# Patient Record
Sex: FEMALE | Race: WHITE | NOT HISPANIC OR LATINO | Employment: OTHER
[De-identification: names, ages, dates, MRNs, and addresses within clinical notes are randomized per-mention and may not be internally consistent; named-entity substitution may affect disease eponyms.]

---

## 2017-06-06 ENCOUNTER — HOSPITAL ENCOUNTER (OUTPATIENT)
Dept: LAB | Age: 72
Setting detail: SPECIMEN
Discharge: HOME | End: 2017-06-06

## 2017-10-05 ENCOUNTER — HOSPITAL ENCOUNTER (OUTPATIENT)
Dept: MAMMOGRAPHY | Facility: HOSPITAL | Age: 72
Discharge: HOME | End: 2017-10-05
Attending: FAMILY MEDICINE

## 2017-10-05 DIAGNOSIS — Z12.31 VISIT FOR SCREENING MAMMOGRAM: ICD-10-CM

## 2017-10-10 ENCOUNTER — RECORDS - HEALTHEAST (OUTPATIENT)
Dept: ADMINISTRATIVE | Facility: OTHER | Age: 72
End: 2017-10-10

## 2017-11-06 ENCOUNTER — ANESTHESIA - HEALTHEAST (OUTPATIENT)
Dept: SURGERY | Facility: CLINIC | Age: 72
End: 2017-11-06

## 2017-11-06 ENCOUNTER — SURGERY - HEALTHEAST (OUTPATIENT)
Dept: SURGERY | Facility: CLINIC | Age: 72
End: 2017-11-06

## 2017-11-07 ASSESSMENT — MIFFLIN-ST. JEOR: SCORE: 1234.32

## 2018-02-20 ENCOUNTER — RECORDS - HEALTHEAST (OUTPATIENT)
Dept: LAB | Facility: CLINIC | Age: 73
End: 2018-02-20

## 2018-02-20 LAB — POTASSIUM BLD-SCNC: 4 MMOL/L (ref 3.5–5)

## 2018-06-12 ENCOUNTER — RECORDS - HEALTHEAST (OUTPATIENT)
Dept: LAB | Facility: CLINIC | Age: 73
End: 2018-06-12

## 2018-06-12 LAB
ALBUMIN SERPL-MCNC: 3.9 G/DL (ref 3.5–5)
ALP SERPL-CCNC: 72 U/L (ref 45–120)
ALT SERPL W P-5'-P-CCNC: 12 U/L (ref 0–45)
ANION GAP SERPL CALCULATED.3IONS-SCNC: 9 MMOL/L (ref 5–18)
AST SERPL W P-5'-P-CCNC: 15 U/L (ref 0–40)
BILIRUB SERPL-MCNC: 0.8 MG/DL (ref 0–1)
BUN SERPL-MCNC: 18 MG/DL (ref 8–28)
CALCIUM SERPL-MCNC: 9.3 MG/DL (ref 8.5–10.5)
CHLORIDE BLD-SCNC: 105 MMOL/L (ref 98–107)
CHOLEST SERPL-MCNC: 162 MG/DL
CO2 SERPL-SCNC: 28 MMOL/L (ref 22–31)
CREAT SERPL-MCNC: 0.68 MG/DL (ref 0.6–1.1)
FASTING STATUS PATIENT QL REPORTED: NO
GFR SERPL CREATININE-BSD FRML MDRD: >60 ML/MIN/1.73M2
GLUCOSE BLD-MCNC: 108 MG/DL (ref 70–125)
HDLC SERPL-MCNC: 69 MG/DL
LDLC SERPL CALC-MCNC: 82 MG/DL
POTASSIUM BLD-SCNC: 4.4 MMOL/L (ref 3.5–5)
PROT SERPL-MCNC: 6.4 G/DL (ref 6–8)
SODIUM SERPL-SCNC: 142 MMOL/L (ref 136–145)
T4 FREE SERPL-MCNC: 1.1 NG/DL (ref 0.7–1.8)
TRIGL SERPL-MCNC: 56 MG/DL
TSH SERPL DL<=0.005 MIU/L-ACNC: 2.64 UIU/ML (ref 0.3–5)

## 2018-11-13 ENCOUNTER — HOSPITAL ENCOUNTER (OUTPATIENT)
Dept: MAMMOGRAPHY | Facility: CLINIC | Age: 73
Discharge: HOME | End: 2018-11-13
Attending: FAMILY MEDICINE

## 2018-11-13 DIAGNOSIS — Z12.31 VISIT FOR SCREENING MAMMOGRAM: ICD-10-CM

## 2018-12-11 ENCOUNTER — RECORDS - HEALTHEAST (OUTPATIENT)
Dept: LAB | Facility: CLINIC | Age: 73
End: 2018-12-11

## 2018-12-11 LAB
T4 FREE SERPL-MCNC: 1.1 NG/DL (ref 0.7–1.8)
TSH SERPL DL<=0.005 MIU/L-ACNC: 2.9 UIU/ML (ref 0.3–5)

## 2019-05-30 ENCOUNTER — RECORDS - HEALTHEAST (OUTPATIENT)
Dept: LAB | Facility: CLINIC | Age: 74
End: 2019-05-30

## 2019-05-30 LAB
ALBUMIN SERPL-MCNC: 4 G/DL (ref 3.5–5)
ALP SERPL-CCNC: 90 U/L (ref 45–120)
ALT SERPL W P-5'-P-CCNC: <9 U/L (ref 0–45)
ANION GAP SERPL CALCULATED.3IONS-SCNC: 12 MMOL/L (ref 5–18)
AST SERPL W P-5'-P-CCNC: 15 U/L (ref 0–40)
BILIRUB SERPL-MCNC: 0.8 MG/DL (ref 0–1)
BUN SERPL-MCNC: 20 MG/DL (ref 8–28)
CALCIUM SERPL-MCNC: 10.3 MG/DL (ref 8.5–10.5)
CHLORIDE BLD-SCNC: 103 MMOL/L (ref 98–107)
CHOLEST SERPL-MCNC: 165 MG/DL
CO2 SERPL-SCNC: 26 MMOL/L (ref 22–31)
CREAT SERPL-MCNC: 0.66 MG/DL (ref 0.6–1.1)
FASTING STATUS PATIENT QL REPORTED: NO
GFR SERPL CREATININE-BSD FRML MDRD: >60 ML/MIN/1.73M2
GLUCOSE BLD-MCNC: 89 MG/DL (ref 70–125)
HDLC SERPL-MCNC: 68 MG/DL
LDLC SERPL CALC-MCNC: 84 MG/DL
POTASSIUM BLD-SCNC: 4.2 MMOL/L (ref 3.5–5)
PROT SERPL-MCNC: 6.4 G/DL (ref 6–8)
SODIUM SERPL-SCNC: 141 MMOL/L (ref 136–145)
T4 FREE SERPL-MCNC: 1.1 NG/DL (ref 0.7–1.8)
TRIGL SERPL-MCNC: 66 MG/DL
TSH SERPL DL<=0.005 MIU/L-ACNC: 2.84 UIU/ML (ref 0.3–5)

## 2019-06-21 ENCOUNTER — RECORDS - HEALTHEAST (OUTPATIENT)
Dept: LAB | Facility: CLINIC | Age: 74
End: 2019-06-21

## 2019-06-21 LAB
ANION GAP SERPL CALCULATED.3IONS-SCNC: 9 MMOL/L (ref 5–18)
BUN SERPL-MCNC: 22 MG/DL (ref 8–28)
CALCIUM SERPL-MCNC: 9.4 MG/DL (ref 8.5–10.5)
CHLORIDE BLD-SCNC: 107 MMOL/L (ref 98–107)
CO2 SERPL-SCNC: 25 MMOL/L (ref 22–31)
CREAT SERPL-MCNC: 0.66 MG/DL (ref 0.6–1.1)
GFR SERPL CREATININE-BSD FRML MDRD: >60 ML/MIN/1.73M2
GLUCOSE BLD-MCNC: 121 MG/DL (ref 70–125)
POTASSIUM BLD-SCNC: 4.3 MMOL/L (ref 3.5–5)
SODIUM SERPL-SCNC: 141 MMOL/L (ref 136–145)

## 2019-09-12 ASSESSMENT — MIFFLIN-ST. JEOR: SCORE: 1238.85

## 2019-09-13 ENCOUNTER — ANESTHESIA - HEALTHEAST (OUTPATIENT)
Dept: SURGERY | Facility: CLINIC | Age: 74
End: 2019-09-13

## 2019-09-16 ENCOUNTER — SURGERY - HEALTHEAST (OUTPATIENT)
Dept: SURGERY | Facility: CLINIC | Age: 74
End: 2019-09-16

## 2019-09-16 ASSESSMENT — MIFFLIN-ST. JEOR
SCORE: 1275.14
SCORE: 1221.28

## 2020-01-14 ENCOUNTER — RECORDS - HEALTHEAST (OUTPATIENT)
Dept: LAB | Facility: CLINIC | Age: 75
End: 2020-01-14

## 2020-01-14 LAB
ANION GAP SERPL CALCULATED.3IONS-SCNC: 11 MMOL/L (ref 5–18)
BUN SERPL-MCNC: 17 MG/DL (ref 8–28)
CALCIUM SERPL-MCNC: 9.2 MG/DL (ref 8.5–10.5)
CHLORIDE BLD-SCNC: 103 MMOL/L (ref 98–107)
CO2 SERPL-SCNC: 26 MMOL/L (ref 22–31)
CREAT SERPL-MCNC: 0.72 MG/DL (ref 0.6–1.1)
GFR SERPL CREATININE-BSD FRML MDRD: >60 ML/MIN/1.73M2
GLUCOSE BLD-MCNC: 91 MG/DL (ref 70–125)
MAGNESIUM SERPL-MCNC: 2.1 MG/DL (ref 1.8–2.6)
POTASSIUM BLD-SCNC: 4.7 MMOL/L (ref 3.5–5)
SODIUM SERPL-SCNC: 140 MMOL/L (ref 136–145)
T4 FREE SERPL-MCNC: 1.2 NG/DL (ref 0.7–1.8)
TSH SERPL DL<=0.005 MIU/L-ACNC: 1.5 UIU/ML (ref 0.3–5)

## 2020-07-10 ENCOUNTER — RECORDS - HEALTHEAST (OUTPATIENT)
Dept: LAB | Facility: CLINIC | Age: 75
End: 2020-07-10

## 2020-07-10 LAB
CHOLEST SERPL-MCNC: 157 MG/DL
FASTING STATUS PATIENT QL REPORTED: NORMAL
HDLC SERPL-MCNC: 66 MG/DL
LDLC SERPL CALC-MCNC: 76 MG/DL
T4 FREE SERPL-MCNC: 1.1 NG/DL (ref 0.7–1.8)
TRIGL SERPL-MCNC: 74 MG/DL
TSH SERPL DL<=0.005 MIU/L-ACNC: 1.51 UIU/ML (ref 0.3–5)

## 2020-07-13 LAB — 25(OH)D3 SERPL-MCNC: 46.5 NG/ML (ref 30–80)

## 2020-07-28 ENCOUNTER — HOSPITAL ENCOUNTER (OUTPATIENT)
Dept: MAMMOGRAPHY | Facility: CLINIC | Age: 75
Discharge: HOME | End: 2020-07-28
Attending: FAMILY MEDICINE

## 2020-07-28 DIAGNOSIS — Z12.31 VISIT FOR SCREENING MAMMOGRAM: ICD-10-CM

## 2021-01-15 ENCOUNTER — RECORDS - HEALTHEAST (OUTPATIENT)
Dept: LAB | Facility: CLINIC | Age: 76
End: 2021-01-15

## 2021-01-15 LAB
ALBUMIN SERPL-MCNC: 3.8 G/DL (ref 3.5–5)
ALP SERPL-CCNC: 96 U/L (ref 45–120)
ALT SERPL W P-5'-P-CCNC: 10 U/L (ref 0–45)
ANION GAP SERPL CALCULATED.3IONS-SCNC: 11 MMOL/L (ref 5–18)
AST SERPL W P-5'-P-CCNC: 16 U/L (ref 0–40)
BASOPHILS # BLD AUTO: 0.1 THOU/UL (ref 0–0.2)
BASOPHILS NFR BLD AUTO: 1 % (ref 0–2)
BILIRUB SERPL-MCNC: 0.6 MG/DL (ref 0–1)
BUN SERPL-MCNC: 23 MG/DL (ref 8–28)
CALCIUM SERPL-MCNC: 9.3 MG/DL (ref 8.5–10.5)
CHLORIDE BLD-SCNC: 103 MMOL/L (ref 98–107)
CO2 SERPL-SCNC: 25 MMOL/L (ref 22–31)
CREAT SERPL-MCNC: 0.74 MG/DL (ref 0.6–1.1)
EOSINOPHIL # BLD AUTO: 0.1 THOU/UL (ref 0–0.4)
EOSINOPHIL NFR BLD AUTO: 1 % (ref 0–6)
ERYTHROCYTE [DISTWIDTH] IN BLOOD BY AUTOMATED COUNT: 13.2 % (ref 11–14.5)
GFR SERPL CREATININE-BSD FRML MDRD: >60 ML/MIN/1.73M2
GLUCOSE BLD-MCNC: 100 MG/DL (ref 70–125)
HCT VFR BLD AUTO: 42.9 % (ref 35–47)
HGB BLD-MCNC: 14 G/DL (ref 12–16)
IMM GRANULOCYTES # BLD: 0 THOU/UL
IMM GRANULOCYTES NFR BLD: 0 %
LYMPHOCYTES # BLD AUTO: 1.4 THOU/UL (ref 0.8–4.4)
LYMPHOCYTES NFR BLD AUTO: 19 % (ref 20–40)
MCH RBC QN AUTO: 30 PG (ref 27–34)
MCHC RBC AUTO-ENTMCNC: 32.6 G/DL (ref 32–36)
MCV RBC AUTO: 92 FL (ref 80–100)
MONOCYTES # BLD AUTO: 0.7 THOU/UL (ref 0–0.9)
MONOCYTES NFR BLD AUTO: 9 % (ref 2–10)
NEUTROPHILS # BLD AUTO: 5.2 THOU/UL (ref 2–7.7)
NEUTROPHILS NFR BLD AUTO: 69 % (ref 50–70)
PLATELET # BLD AUTO: 233 THOU/UL (ref 140–440)
PMV BLD AUTO: 11.1 FL (ref 8.5–12.5)
POTASSIUM BLD-SCNC: 4.8 MMOL/L (ref 3.5–5)
PROT SERPL-MCNC: 6.3 G/DL (ref 6–8)
RBC # BLD AUTO: 4.67 MILL/UL (ref 3.8–5.4)
SODIUM SERPL-SCNC: 139 MMOL/L (ref 136–145)
TSH SERPL DL<=0.005 MIU/L-ACNC: 2.27 UIU/ML (ref 0.3–5)
WBC: 7.5 THOU/UL (ref 4–11)

## 2021-05-25 ENCOUNTER — RECORDS - HEALTHEAST (OUTPATIENT)
Dept: ADMINISTRATIVE | Facility: CLINIC | Age: 76
End: 2021-05-25

## 2021-05-26 ENCOUNTER — RECORDS - HEALTHEAST (OUTPATIENT)
Dept: ADMINISTRATIVE | Facility: CLINIC | Age: 76
End: 2021-05-26

## 2021-05-27 ENCOUNTER — RECORDS - HEALTHEAST (OUTPATIENT)
Dept: ADMINISTRATIVE | Facility: CLINIC | Age: 76
End: 2021-05-27

## 2021-05-28 ENCOUNTER — RECORDS - HEALTHEAST (OUTPATIENT)
Dept: ADMINISTRATIVE | Facility: CLINIC | Age: 76
End: 2021-05-28

## 2021-05-31 VITALS — BODY MASS INDEX: 33.79 KG/M2 | HEIGHT: 61 IN | WEIGHT: 179 LBS

## 2021-06-01 NOTE — ANESTHESIA POSTPROCEDURE EVALUATION
Patient: Radha Tatum  RIGHT REVERSE TOTAL SHOULDER ARTHROPLASTY  Anesthesia type: general    Patient location: PACU  Last vitals:   Vitals Value Taken Time   /67 9/16/2019 10:30 AM   Temp 36.2  C (97.2  F) 9/16/2019 10:30 AM   Pulse 71 9/16/2019 10:30 AM   Resp 14 9/16/2019 10:30 AM   SpO2 91 % 9/16/2019 10:32 AM     Post vital signs: stable  Level of consciousness: awake and responds to simple questions  Post-anesthesia pain: pain controlled  Post-anesthesia nausea and vomiting: no  Pulmonary: unassisted, return to baseline  Cardiovascular: stable and blood pressure at baseline  Hydration: adequate  Anesthetic events: no    QCDR Measures:  ASA# 11 - Paz-op Cardiac Arrest: ASA11B - Patient did NOT experience unanticipated cardiac arrest  ASA# 12 - Paz-op Mortality Rate: ASA12B - Patient did NOT die  ASA# 13 - PACU Re-Intubation Rate: ASA13B - Patient did NOT require a new airway mgmt  ASA# 10 - Composite Anes Safety: ASA10A - No serious adverse event    Additional Notes:

## 2021-06-01 NOTE — ANESTHESIA PROCEDURE NOTES
Peripheral Block    Patient location during procedure: pre-op  Start time: 9/16/2019 7:24 AM  End time: 9/16/2019 7:27 AM  post-op analgesia per surgeon order as noted in medical record  Staffing:  Performing  Anesthesiologist: Lexx Godwin MD  Preanesthetic Checklist  Completed: patient identified, site marked, risks, benefits, and alternatives discussed, timeout performed, consent obtained, airway assessed, oxygen available, suction available, emergency drugs available and hand hygiene performed  Peripheral Block  Block type: brachial plexus, interscalene  Prep: ChloraPrep  Patient position: sitting  Patient monitoring: cardiac monitor, continuous pulse oximetry, heart rate and blood pressure  Laterality: right  Injection technique: ultrasound guided    Ultrasound used to visualize needle placement in proximity to nerve being blocked: yes   Permanent ultrasound image captured for medical record  Sterile gel and probe cover used for ultrasound.    Needle  Needle type: Stimuplex   Needle gauge: 20G  Needle length: 4 in  no peripheral nerve catheter placed  Assessment  Injection assessment: no difficulty with injection, negative aspiration for heme, no paresthesia on injection, incremental injection and transient paresthesias

## 2021-06-01 NOTE — ANESTHESIA CARE TRANSFER NOTE
Last vitals:   Vitals:    09/16/19 0935   BP: 156/66   Pulse: 75   Resp: 16   Temp: 36.2  C (97.2  F)   SpO2: 94%     Patient's level of consciousness is drowsy  Spontaneous respirations: yes  Maintains airway independently: yes  Dentition unchanged: yes  Oropharynx: oropharynx clear of all foreign objects    QCDR Measures:  ASA# 20 - Surgical Safety Checklist: WHO surgical safety checklist completed prior to induction    PQRS# 430 - Adult PONV Prevention: 4558F - Pt received => 2 anti-emetic agents (different classes) preop & intraop  ASA# 8 - Peds PONV Prevention: NA - Not pediatric patient, not GA or 2 or more risk factors NOT present  PQRS# 424 - Paz-op Temp Management: 4559F - At least one body temp DOCUMENTED => 35.5C or 95.9F within required timeframe  PQRS# 426 - PACU Transfer Protocol: - Transfer of care checklist used  ASA# 14 - Acute Post-op Pain: ASA14B - Patient did NOT experience pain >= 7 out of 10

## 2021-06-01 NOTE — ANESTHESIA PREPROCEDURE EVALUATION
Anesthesia Evaluation      Patient summary reviewed   History of anesthetic complications     Airway   Mallampati: I  Neck ROM: full   Pulmonary - negative ROS and normal exam    breath sounds clear to auscultation                         Cardiovascular - normal exam  (+) hypertension well controlled, CAD, ,     Rhythm: regular  Rate: normal,         Neuro/Psych - negative ROS     Endo/Other    (+) hypothyroidism, hyperthyroidism, arthritis, obesity,      GI/Hepatic/Renal - negative ROS           Dental    (+) caps and implants                       Anesthesia Plan  Planned anesthetic: general endotracheal and peripheral nerve block    ASA 3   Induction: intravenous   Anesthetic plan and risks discussed with: patient  Anesthesia plan special considerations: antiemetics,   Post-op plan: routine recovery

## 2021-06-03 VITALS — WEIGHT: 188 LBS | HEIGHT: 61 IN | BODY MASS INDEX: 35.5 KG/M2

## 2021-06-05 ENCOUNTER — RECORDS - HEALTHEAST (OUTPATIENT)
Dept: ENDOCRINOLOGY | Facility: CLINIC | Age: 76
End: 2021-06-05

## 2021-06-05 ENCOUNTER — RECORDS - HEALTHEAST (OUTPATIENT)
Dept: MAMMOGRAPHY | Facility: HOSPITAL | Age: 76
End: 2021-06-05

## 2021-06-05 DIAGNOSIS — E04.2 NONTOXIC MULTINODULAR GOITER: ICD-10-CM

## 2021-06-05 DIAGNOSIS — R92.8 OTHER ABNORMAL FINDINGS ON RADIOLOGICAL EXAMINATION OF BREAST: ICD-10-CM

## 2021-06-13 NOTE — ANESTHESIA PREPROCEDURE EVALUATION
Anesthesia Evaluation      Patient summary reviewed   History of anesthetic complications     Airway   Mallampati: II   Pulmonary - normal exam                          Cardiovascular   (+) hypertension, CAD, ,     Rhythm: regular  Rate: normal,         Neuro/Psych      Endo/Other    (+) hypothyroidism, hyperthyroidism,      GI/Hepatic/Renal       Other findings:                          Hyperlipidemia     Hypertension    Coronary Artery Disease    Nontoxic Multinodular Goiter    Subclinical Hyperthyroidism    Vitamin D Deficiency    Osteopenia    Postsurgical Hypothyroidism        UPPER IMPLANT (PERMANENT)      Dental - normal exam                        Anesthesia Plan  Planned anesthetic: spinal  SAB + SAPH+TIB FOR POSTOP PAIN  PONV HX - DEC 10 ZOF 4    MEDICALLY MANAGED CAD - NO SX    THYROIDECTOMY WITH DR DEGROOT - UNEVENTFUL ANESTHETIC  ASA 3     Anesthetic plan and risks discussed with: patient  Anesthesia plan special considerations: antiemetics,   Post-op plan: routine recovery

## 2021-06-13 NOTE — ANESTHESIA CARE TRANSFER NOTE
Last vitals:   Vitals:    11/06/17 1209   BP: (P) 112/74   Pulse: (P) 76   Resp: (P) 10   Temp: (P) 36.8  C (98.2  F)   SpO2: (P) 95%     Patient's level of consciousness is awake  Spontaneous respirations: yes  Maintains airway independently: yes  Dentition unchanged: yes  Oropharynx: oropharynx clear of all foreign objects    QCDR Measures:  ASA# 20 - Surgical Safety Checklist: WHO surgical safety checklist completed prior to induction  PQRS# 430 - Adult PONV Prevention: 4558F - Pt received => 2 anti-emetic agents (different classes) preop & intraop  ASA# 8 - Peds PONV Prevention: NA - Not pediatric patient, not GA or 2 or more risk factors NOT present  PQRS# 424 - Paz-op Temp Management: 4559F - At least one body temp DOCUMENTED => 35.5C or 95.9F within required timeframe  PQRS# 426 - PACU Transfer Protocol: - Transfer of care checklist used  ASA# 14 - Acute Post-op Pain: ASA14B - Patient did NOT experience pain >= 7 out of 10

## 2021-06-13 NOTE — ANESTHESIA PROCEDURE NOTES
Peripheral Block    Start time: 11/6/2017 9:26 AM  End time: 11/6/2017 9:31 AM  post-op analgesia per surgeon order as noted in medical record  Staffing:  Performing  Anesthesiologist: LIZZY BRISENO  Preanesthetic Checklist  Completed: patient identified, site marked, risks, benefits, and alternatives discussed, timeout performed, consent obtained, airway assessed, oxygen available, suction available, emergency drugs available and hand hygiene performed  Peripheral Block  Nerve block type: tib.  Prep: ChloraPrep  Patient position: supine  Patient monitoring: cardiac monitor, continuous pulse oximetry, heart rate and blood pressure  Laterality: left  Injection technique: ultrasound guided    Ultrasound used to visualize needle placement in proximity to nerve being blocked: yes   Permanent ultrasound image captured for medical record    Needle  Needle type: Stimuplex   Needle gauge: 21 G  Needle length: 4 in  no peripheral nerve catheter placed  Assessment  Injection assessment: no difficulty with injection, negative aspiration for heme, no paresthesia on injection and incremental injection

## 2021-06-13 NOTE — ANESTHESIA PROCEDURE NOTES
Peripheral Block    Start time: 11/6/2017 9:31 AM  End time: 11/6/2017 9:32 AM  post-op analgesia per surgeon order as noted in medical record  Staffing:  Performing  Anesthesiologist: LIZZY BRISENO  Preanesthetic Checklist  Completed: patient identified, site marked, risks, benefits, and alternatives discussed, timeout performed, consent obtained, airway assessed, oxygen available, suction available, emergency drugs available and hand hygiene performed  Peripheral Block  Block type: saphenous, adductor canal block  Prep: ChloraPrep  Patient position: supine  Patient monitoring: cardiac monitor, continuous pulse oximetry, heart rate and blood pressure  Laterality: left  Injection technique: ultrasound guided    Ultrasound used to visualize needle placement in proximity to nerve being blocked: yes   Permanent ultrasound image captured for medical record  lidocaine 1% local anesthesia used for local skin prep  Needle  Needle type: Stimuplex   Needle gauge: 21 G  Needle length: 4 in  no peripheral nerve catheter placed  Assessment  Injection assessment: no difficulty with injection, negative aspiration for heme, no paresthesia on injection and incremental injection

## 2021-06-13 NOTE — ANESTHESIA PROCEDURE NOTES
Spinal Block    Patient location during procedure: OR  Start time: 11/6/2017 10:00 AM  End time: 11/6/2017 10:06 AM  Reason for block: primary anesthetic    Staffing:  Performing  Anesthesiologist: LIZZY BRISENO    Preanesthetic Checklist  Completed: patient identified, risks, benefits, and alternatives discussed, timeout performed, consent obtained, airway assessed, oxygen available, suction available, emergency drugs available and hand hygiene performed  Spinal Block  Patient position: sitting  Prep: ChloraPrep  Patient monitoring: heart rate, cardiac monitor, continuous pulse ox and blood pressure  Approach: midline  Location: L4-5  Injection technique: single-shot  Needle type: pencil-tip   Needle gauge: 24 G    Assessment  Sensory level: T6

## 2021-06-13 NOTE — ANESTHESIA POSTPROCEDURE EVALUATION
Patient: Radha Tatum  LEFT TOTAL KNEE ARTHROPLASTY  Anesthesia type: spinal    Patient location: PACU  Last vitals:   Vitals:    11/06/17 1250   BP: 133/60   Pulse: 70   Resp: 17   Temp: 36.7  C (98  F)   SpO2: 95%     Post vital signs: stable  Level of consciousness: awake and responds to simple questions  Post-anesthesia pain: pain controlled  Post-anesthesia nausea and vomiting: no  Pulmonary: unassisted, return to baseline  Cardiovascular: stable and blood pressure at baseline  Hydration: adequate  Anesthetic events: no    QCDR Measures:  ASA# 11 - Paz-op Cardiac Arrest: ASA11B - Patient did NOT experience unanticipated cardiac arrest  ASA# 12 - Paz-op Mortality Rate: ASA12B - Patient did NOT die  ASA# 13 - PACU Re-Intubation Rate: NA - No ETT / LMA used for case  ASA# 10 - Composite Anes Safety: ASA10A - No serious adverse event    Additional Notes:

## 2021-06-16 PROBLEM — M12.811 ROTATOR CUFF ARTHROPATHY OF RIGHT SHOULDER: Status: ACTIVE | Noted: 2019-09-16

## 2021-06-16 PROBLEM — M17.12 OSTEOARTHRITIS OF LEFT KNEE: Status: ACTIVE | Noted: 2017-11-06

## 2021-07-13 ENCOUNTER — RECORDS - HEALTHEAST (OUTPATIENT)
Dept: ADMINISTRATIVE | Facility: CLINIC | Age: 76
End: 2021-07-13

## 2021-07-16 ENCOUNTER — LAB REQUISITION (OUTPATIENT)
Dept: LAB | Facility: CLINIC | Age: 76
End: 2021-07-16
Payer: MEDICARE

## 2021-07-16 DIAGNOSIS — E55.9 VITAMIN D DEFICIENCY, UNSPECIFIED: ICD-10-CM

## 2021-07-16 DIAGNOSIS — E03.9 HYPOTHYROIDISM, UNSPECIFIED: ICD-10-CM

## 2021-07-16 PROCEDURE — 84443 ASSAY THYROID STIM HORMONE: CPT | Mod: ORL | Performed by: FAMILY MEDICINE

## 2021-07-16 PROCEDURE — 84439 ASSAY OF FREE THYROXINE: CPT | Mod: ORL | Performed by: FAMILY MEDICINE

## 2021-07-16 PROCEDURE — 82306 VITAMIN D 25 HYDROXY: CPT | Mod: ORL | Performed by: FAMILY MEDICINE

## 2021-07-17 LAB
T4 FREE SERPL-MCNC: 1.23 NG/DL (ref 0.7–1.8)
TSH SERPL DL<=0.005 MIU/L-ACNC: 1.88 UIU/ML (ref 0.3–5)

## 2021-07-19 LAB — DEPRECATED CALCIDIOL+CALCIFEROL SERPL-MC: 42 UG/L (ref 30–80)

## 2021-07-21 ENCOUNTER — RECORDS - HEALTHEAST (OUTPATIENT)
Dept: ADMINISTRATIVE | Facility: CLINIC | Age: 76
End: 2021-07-21

## 2021-07-22 ENCOUNTER — RECORDS - HEALTHEAST (OUTPATIENT)
Dept: SCHEDULING | Facility: CLINIC | Age: 76
End: 2021-07-22

## 2021-07-22 DIAGNOSIS — Z12.31 OTHER SCREENING MAMMOGRAM: ICD-10-CM

## 2021-07-23 ENCOUNTER — RECORDS - HEALTHEAST (OUTPATIENT)
Dept: MAMMOGRAPHY | Facility: HOSPITAL | Age: 76
End: 2021-07-23

## 2021-07-23 DIAGNOSIS — R92.0 MAMMOGRAPHIC MICROCALCIFICATION: ICD-10-CM

## 2021-08-18 ENCOUNTER — ANCILLARY PROCEDURE (OUTPATIENT)
Dept: MAMMOGRAPHY | Facility: CLINIC | Age: 76
End: 2021-08-18
Attending: FAMILY MEDICINE
Payer: MEDICARE

## 2021-08-18 DIAGNOSIS — Z12.31 SCREENING MAMMOGRAM, ENCOUNTER FOR: ICD-10-CM

## 2021-08-18 PROCEDURE — 77063 BREAST TOMOSYNTHESIS BI: CPT

## 2022-01-04 ENCOUNTER — LAB REQUISITION (OUTPATIENT)
Dept: LAB | Facility: CLINIC | Age: 77
End: 2022-01-04
Payer: MEDICARE

## 2022-01-04 DIAGNOSIS — R10.11 RIGHT UPPER QUADRANT PAIN: ICD-10-CM

## 2022-01-04 LAB
ALBUMIN SERPL-MCNC: 3.5 G/DL (ref 3.5–5)
ALP SERPL-CCNC: 89 U/L (ref 45–120)
ALT SERPL W P-5'-P-CCNC: 9 U/L (ref 0–45)
ANION GAP SERPL CALCULATED.3IONS-SCNC: 13 MMOL/L (ref 5–18)
AST SERPL W P-5'-P-CCNC: 12 U/L (ref 0–40)
BILIRUB SERPL-MCNC: 0.5 MG/DL (ref 0–1)
BUN SERPL-MCNC: 22 MG/DL (ref 8–28)
CALCIUM SERPL-MCNC: 9.5 MG/DL (ref 8.5–10.5)
CHLORIDE BLD-SCNC: 103 MMOL/L (ref 98–107)
CO2 SERPL-SCNC: 25 MMOL/L (ref 22–31)
CREAT SERPL-MCNC: 0.7 MG/DL (ref 0.6–1.1)
GFR SERPL CREATININE-BSD FRML MDRD: 88 ML/MIN/1.73M2
GLUCOSE BLD-MCNC: 110 MG/DL (ref 70–125)
LIPASE SERPL-CCNC: 189 U/L (ref 0–52)
POTASSIUM BLD-SCNC: 4.5 MMOL/L (ref 3.5–5)
PROT SERPL-MCNC: 6.1 G/DL (ref 6–8)
SODIUM SERPL-SCNC: 141 MMOL/L (ref 136–145)

## 2022-01-04 PROCEDURE — 80053 COMPREHEN METABOLIC PANEL: CPT | Mod: ORL | Performed by: FAMILY MEDICINE

## 2022-01-04 PROCEDURE — 83690 ASSAY OF LIPASE: CPT | Mod: ORL | Performed by: FAMILY MEDICINE

## 2022-01-13 ENCOUNTER — LAB REQUISITION (OUTPATIENT)
Dept: LAB | Facility: CLINIC | Age: 77
End: 2022-01-13
Payer: MEDICARE

## 2022-01-13 LAB
CHOLEST SERPL-MCNC: 134 MG/DL
FASTING STATUS PATIENT QL REPORTED: ABNORMAL
HDLC SERPL-MCNC: 42 MG/DL
LDLC SERPL CALC-MCNC: 68 MG/DL
TRIGL SERPL-MCNC: 122 MG/DL
TSH SERPL DL<=0.005 MIU/L-ACNC: 1.64 UIU/ML (ref 0.3–5)

## 2022-01-13 PROCEDURE — 80061 LIPID PANEL: CPT | Mod: ORL | Performed by: FAMILY MEDICINE

## 2022-01-13 PROCEDURE — 84443 ASSAY THYROID STIM HORMONE: CPT | Mod: ORL | Performed by: FAMILY MEDICINE

## 2022-01-27 DIAGNOSIS — Z11.59 ENCOUNTER FOR SCREENING FOR OTHER VIRAL DISEASES: Primary | ICD-10-CM

## 2022-02-03 ENCOUNTER — LAB REQUISITION (OUTPATIENT)
Dept: LAB | Facility: CLINIC | Age: 77
End: 2022-02-03
Payer: MEDICARE

## 2022-02-03 DIAGNOSIS — Z01.818 ENCOUNTER FOR OTHER PREPROCEDURAL EXAMINATION: ICD-10-CM

## 2022-02-03 LAB
ANION GAP SERPL CALCULATED.3IONS-SCNC: 11 MMOL/L (ref 5–18)
BUN SERPL-MCNC: 19 MG/DL (ref 8–28)
CALCIUM SERPL-MCNC: 9.4 MG/DL (ref 8.5–10.5)
CHLORIDE BLD-SCNC: 103 MMOL/L (ref 98–107)
CO2 SERPL-SCNC: 28 MMOL/L (ref 22–31)
CREAT SERPL-MCNC: 0.86 MG/DL (ref 0.6–1.1)
GFR SERPL CREATININE-BSD FRML MDRD: 69 ML/MIN/1.73M2
GLUCOSE BLD-MCNC: 167 MG/DL (ref 70–125)
POTASSIUM BLD-SCNC: 4.8 MMOL/L (ref 3.5–5)
SODIUM SERPL-SCNC: 142 MMOL/L (ref 136–145)

## 2022-02-03 PROCEDURE — 80048 BASIC METABOLIC PNL TOTAL CA: CPT | Mod: ORL | Performed by: FAMILY MEDICINE

## 2022-02-03 PROCEDURE — U0003 INFECTIOUS AGENT DETECTION BY NUCLEIC ACID (DNA OR RNA); SEVERE ACUTE RESPIRATORY SYNDROME CORONAVIRUS 2 (SARS-COV-2) (CORONAVIRUS DISEASE [COVID-19]), AMPLIFIED PROBE TECHNIQUE, MAKING USE OF HIGH THROUGHPUT TECHNOLOGIES AS DESCRIBED BY CMS-2020-01-R: HCPCS | Mod: ORL | Performed by: FAMILY MEDICINE

## 2022-02-04 LAB — SARS-COV-2 RNA RESP QL NAA+PROBE: NEGATIVE

## 2022-02-04 RX ORDER — NAPROXEN 500 MG/1
500 TABLET ORAL 2 TIMES DAILY PRN
COMMUNITY

## 2022-02-04 RX ORDER — NICOTINE POLACRILEX 4 MG/1
20 GUM, CHEWING ORAL DAILY
COMMUNITY

## 2022-02-07 ENCOUNTER — ANESTHESIA (OUTPATIENT)
Dept: SURGERY | Facility: HOSPITAL | Age: 77
End: 2022-02-07
Payer: MEDICARE

## 2022-02-07 ENCOUNTER — HOSPITAL ENCOUNTER (OUTPATIENT)
Facility: HOSPITAL | Age: 77
Discharge: HOME | End: 2022-02-07
Attending: INTERNAL MEDICINE | Admitting: INTERNAL MEDICINE
Payer: MEDICARE

## 2022-02-07 ENCOUNTER — ANCILLARY PROCEDURE (OUTPATIENT)
Dept: ULTRASOUND IMAGING | Facility: HOSPITAL | Age: 77
End: 2022-02-07
Attending: ANESTHESIOLOGY
Payer: MEDICARE

## 2022-02-07 ENCOUNTER — ANESTHESIA EVENT (OUTPATIENT)
Dept: SURGERY | Facility: HOSPITAL | Age: 77
End: 2022-02-07
Payer: MEDICARE

## 2022-02-07 VITALS
HEART RATE: 57 BPM | SYSTOLIC BLOOD PRESSURE: 134 MMHG | BODY MASS INDEX: 31.97 KG/M2 | TEMPERATURE: 97 F | RESPIRATION RATE: 16 BRPM | WEIGHT: 169.2 LBS | OXYGEN SATURATION: 98 % | DIASTOLIC BLOOD PRESSURE: 55 MMHG

## 2022-02-07 DIAGNOSIS — K27.9 PEPTIC ULCER: Primary | ICD-10-CM

## 2022-02-07 LAB — UPPER EUS: NORMAL

## 2022-02-07 PROCEDURE — 710N000012 HC RECOVERY PHASE 2, PER MINUTE: Performed by: INTERNAL MEDICINE

## 2022-02-07 PROCEDURE — 258N000003 HC RX IP 258 OP 636: Performed by: ANESTHESIOLOGY

## 2022-02-07 PROCEDURE — 360N000076 HC SURGERY LEVEL 3, PER MIN: Performed by: INTERNAL MEDICINE

## 2022-02-07 PROCEDURE — 250N000011 HC RX IP 250 OP 636

## 2022-02-07 PROCEDURE — 370N000017 HC ANESTHESIA TECHNICAL FEE, PER MIN: Performed by: INTERNAL MEDICINE

## 2022-02-07 PROCEDURE — 250N000009 HC RX 250

## 2022-02-07 PROCEDURE — 999N000141 HC STATISTIC PRE-PROCEDURE NURSING ASSESSMENT: Performed by: INTERNAL MEDICINE

## 2022-02-07 PROCEDURE — 88305 TISSUE EXAM BY PATHOLOGIST: CPT | Mod: TC | Performed by: INTERNAL MEDICINE

## 2022-02-07 PROCEDURE — C1726 CATH, BAL DIL, NON-VASCULAR: HCPCS | Performed by: INTERNAL MEDICINE

## 2022-02-07 PROCEDURE — 272N000001 HC OR GENERAL SUPPLY STERILE: Performed by: INTERNAL MEDICINE

## 2022-02-07 RX ORDER — PROPOFOL 10 MG/ML
INJECTION, EMULSION INTRAVENOUS PRN
Status: DISCONTINUED | OUTPATIENT
Start: 2022-02-07 | End: 2022-02-07

## 2022-02-07 RX ORDER — FLUMAZENIL 0.1 MG/ML
0.2 INJECTION, SOLUTION INTRAVENOUS
Status: DISCONTINUED | OUTPATIENT
Start: 2022-02-07 | End: 2022-02-07 | Stop reason: HOSPADM

## 2022-02-07 RX ORDER — ONDANSETRON 2 MG/ML
4 INJECTION INTRAMUSCULAR; INTRAVENOUS EVERY 6 HOURS PRN
Status: DISCONTINUED | OUTPATIENT
Start: 2022-02-07 | End: 2022-02-07 | Stop reason: HOSPADM

## 2022-02-07 RX ORDER — FENTANYL CITRATE 50 UG/ML
50 INJECTION, SOLUTION INTRAMUSCULAR; INTRAVENOUS
Status: DISCONTINUED | OUTPATIENT
Start: 2022-02-07 | End: 2022-02-07 | Stop reason: HOSPADM

## 2022-02-07 RX ORDER — LIDOCAINE 40 MG/G
CREAM TOPICAL
Status: DISCONTINUED | OUTPATIENT
Start: 2022-02-07 | End: 2022-02-07 | Stop reason: HOSPADM

## 2022-02-07 RX ORDER — LIDOCAINE HYDROCHLORIDE 20 MG/ML
INJECTION, SOLUTION INFILTRATION; PERINEURAL PRN
Status: DISCONTINUED | OUTPATIENT
Start: 2022-02-07 | End: 2022-02-07

## 2022-02-07 RX ORDER — NALOXONE HYDROCHLORIDE 0.4 MG/ML
0.2 INJECTION, SOLUTION INTRAMUSCULAR; INTRAVENOUS; SUBCUTANEOUS
Status: DISCONTINUED | OUTPATIENT
Start: 2022-02-07 | End: 2022-02-07 | Stop reason: HOSPADM

## 2022-02-07 RX ORDER — ASPIRIN 81 MG/1
81 TABLET ORAL DAILY
COMMUNITY

## 2022-02-07 RX ORDER — ONDANSETRON 4 MG/1
4 TABLET, ORALLY DISINTEGRATING ORAL EVERY 30 MIN PRN
Status: DISCONTINUED | OUTPATIENT
Start: 2022-02-07 | End: 2022-02-07 | Stop reason: HOSPADM

## 2022-02-07 RX ORDER — GLYCOPYRROLATE 0.2 MG/ML
INJECTION, SOLUTION INTRAMUSCULAR; INTRAVENOUS PRN
Status: DISCONTINUED | OUTPATIENT
Start: 2022-02-07 | End: 2022-02-07

## 2022-02-07 RX ORDER — NALOXONE HYDROCHLORIDE 0.4 MG/ML
0.4 INJECTION, SOLUTION INTRAMUSCULAR; INTRAVENOUS; SUBCUTANEOUS
Status: DISCONTINUED | OUTPATIENT
Start: 2022-02-07 | End: 2022-02-07 | Stop reason: HOSPADM

## 2022-02-07 RX ORDER — OXYCODONE HYDROCHLORIDE 5 MG/1
5 TABLET ORAL EVERY 4 HOURS PRN
Status: DISCONTINUED | OUTPATIENT
Start: 2022-02-07 | End: 2022-02-07 | Stop reason: HOSPADM

## 2022-02-07 RX ORDER — ONDANSETRON 4 MG/1
4 TABLET, ORALLY DISINTEGRATING ORAL EVERY 6 HOURS PRN
Status: DISCONTINUED | OUTPATIENT
Start: 2022-02-07 | End: 2022-02-07 | Stop reason: HOSPADM

## 2022-02-07 RX ORDER — ONDANSETRON 2 MG/ML
INJECTION INTRAMUSCULAR; INTRAVENOUS PRN
Status: DISCONTINUED | OUTPATIENT
Start: 2022-02-07 | End: 2022-02-07

## 2022-02-07 RX ORDER — ONDANSETRON 2 MG/ML
4 INJECTION INTRAMUSCULAR; INTRAVENOUS EVERY 30 MIN PRN
Status: DISCONTINUED | OUTPATIENT
Start: 2022-02-07 | End: 2022-02-07 | Stop reason: HOSPADM

## 2022-02-07 RX ORDER — OMEPRAZOLE 40 MG/1
40 CAPSULE, DELAYED RELEASE ORAL 2 TIMES DAILY
Qty: 60 CAPSULE | Refills: 3 | Status: SHIPPED | OUTPATIENT
Start: 2022-02-07

## 2022-02-07 RX ORDER — PROPOFOL 10 MG/ML
INJECTION, EMULSION INTRAVENOUS CONTINUOUS PRN
Status: DISCONTINUED | OUTPATIENT
Start: 2022-02-07 | End: 2022-02-07

## 2022-02-07 RX ORDER — HYDROMORPHONE HCL IN WATER/PF 6 MG/30 ML
0.2 PATIENT CONTROLLED ANALGESIA SYRINGE INTRAVENOUS EVERY 5 MIN PRN
Status: DISCONTINUED | OUTPATIENT
Start: 2022-02-07 | End: 2022-02-07 | Stop reason: HOSPADM

## 2022-02-07 RX ORDER — SODIUM CHLORIDE, SODIUM LACTATE, POTASSIUM CHLORIDE, CALCIUM CHLORIDE 600; 310; 30; 20 MG/100ML; MG/100ML; MG/100ML; MG/100ML
INJECTION, SOLUTION INTRAVENOUS CONTINUOUS
Status: DISCONTINUED | OUTPATIENT
Start: 2022-02-07 | End: 2022-02-07 | Stop reason: HOSPADM

## 2022-02-07 RX ORDER — PROCHLORPERAZINE MALEATE 5 MG
5 TABLET ORAL EVERY 6 HOURS PRN
Status: DISCONTINUED | OUTPATIENT
Start: 2022-02-07 | End: 2022-02-07 | Stop reason: HOSPADM

## 2022-02-07 RX ORDER — FENTANYL CITRATE 50 UG/ML
25 INJECTION, SOLUTION INTRAMUSCULAR; INTRAVENOUS
Status: DISCONTINUED | OUTPATIENT
Start: 2022-02-07 | End: 2022-02-07 | Stop reason: HOSPADM

## 2022-02-07 RX ORDER — FENTANYL CITRATE 50 UG/ML
25 INJECTION, SOLUTION INTRAMUSCULAR; INTRAVENOUS EVERY 5 MIN PRN
Status: DISCONTINUED | OUTPATIENT
Start: 2022-02-07 | End: 2022-02-07 | Stop reason: HOSPADM

## 2022-02-07 RX ORDER — MEPERIDINE HYDROCHLORIDE 25 MG/ML
12.5 INJECTION INTRAMUSCULAR; INTRAVENOUS; SUBCUTANEOUS
Status: DISCONTINUED | OUTPATIENT
Start: 2022-02-07 | End: 2022-02-07 | Stop reason: HOSPADM

## 2022-02-07 RX ADMIN — ONDANSETRON 4 MG: 2 INJECTION INTRAMUSCULAR; INTRAVENOUS at 11:15

## 2022-02-07 RX ADMIN — PROPOFOL 50 MG: 10 INJECTION, EMULSION INTRAVENOUS at 11:12

## 2022-02-07 RX ADMIN — PROPOFOL 150 MCG/KG/MIN: 10 INJECTION, EMULSION INTRAVENOUS at 11:12

## 2022-02-07 RX ADMIN — SODIUM CHLORIDE, POTASSIUM CHLORIDE, SODIUM LACTATE AND CALCIUM CHLORIDE: 600; 310; 30; 20 INJECTION, SOLUTION INTRAVENOUS at 10:43

## 2022-02-07 RX ADMIN — GLYCOPYRROLATE 0.2 MG: 0.2 INJECTION, SOLUTION INTRAMUSCULAR; INTRAVENOUS at 11:12

## 2022-02-07 RX ADMIN — LIDOCAINE HYDROCHLORIDE 60 MG: 20 INJECTION, SOLUTION INFILTRATION; PERINEURAL at 11:11

## 2022-02-07 NOTE — H&P
GENERAL PRE-PROCEDURE:   Procedure:  EUS - Pancreatic Duct Abnormality  Date/Time:  2/7/2022 10:30 AM    Verbal consent obtained?: Yes    Written consent obtained?: Yes    Risks and benefits: Risks, benefits and alternatives were discussed    Consent given by:  Patient  Patient states understanding of procedure being performed: Yes    Patient's understanding of procedure matches consent: Yes    Procedure consent matches procedure scheduled: Yes    Expected level of sedation:  Deep  Appropriately NPO:  Yes  ASA Class:  3  Mallampati  :  Grade 2- soft palate, base of uvula, tonsillar pillars, and portion of posterior pharyngeal wall visible  Lungs:  Lungs clear with good breath sounds bilaterally  Heart:  Normal heart sounds and rate  History & Physical reviewed:  History and physical reviewed and no updates needed  Statement of review:  I have reviewed the lab findings, diagnostic data, medications, and the plan for sedation

## 2022-02-07 NOTE — ANESTHESIA POSTPROCEDURE EVALUATION
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    Anesthesia Type:  MAC    Note:  Disposition: Outpatient   Postop Pain Control: Uneventful            Sign Out: Well controlled pain   PONV: No   Neuro/Psych: Uneventful            Sign Out: Acceptable/Baseline neuro status   Airway/Respiratory: Uneventful            Sign Out: Acceptable/Baseline resp. status   CV/Hemodynamics: Uneventful            Sign Out: Acceptable CV status; No obvious hypovolemia; No obvious fluid overload   Other NRE: NONE   DID A NON-ROUTINE EVENT OCCUR? No           Last vitals:  Vitals Value Taken Time   /69 02/07/22 1216   Temp 36.1  C (97  F) 02/07/22 1159   Pulse 55 02/07/22 1216   Resp     SpO2 94 % 02/07/22 1216   Vitals shown include unvalidated device data.    Electronically Signed By: Amanda Monaco MD  February 7, 2022  12:17 PM

## 2022-02-07 NOTE — ANESTHESIA PREPROCEDURE EVALUATION
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]    Procedure : Procedure(s):  ENDOSCOPIC ULTRASOUND WITH FINE NEEDLE ASPIRATION          Past Medical History:   Diagnosis Date     Arthritis      Bradycardia      Cataract      Coronary artery disease     required no intervention     Degenerative joint disease (DJD) of hip      Disease of thyroid gland      Glaucoma      Hearing loss      History of [place] measles      Hyperlipidemia      Hypertension      Hypothyroidism      Macular degeneration (senile) of retina      Motion sickness      Multinodular goiter      Osteoporosis      PONV (postoperative nausea and vomiting)       Past Surgical History:   Procedure Laterality Date     ARTHROSCOPY KNEE Right      BIOPSY BREAST Left 2014    atypical loublar hyperplasia     CATARACT EXTRACTION Bilateral      EYE SURGERY      retinal     HC REVISE MEDIAN N/CARPAL TUNNEL SURG      Description: Neuroplasty Decompression Median Nerve At Carpal Tunnel;  Recorded: 2012;     HYSTERECTOMY  2005     JOINT REPLACEMENT Left 2017    knee     OOPHORECTOMY Bilateral      THYROIDECTOMY, PARTIAL N/A 2014    Procedure: THYROIDECTOMY TOTAL;  Surgeon: Aubrey Ralph MD;  Location: [facility];  Service:      [facility] APPENDECTOMY      Description: Appendectomy;  Recorded: 2012;     [facility] RECONSTR TOTAL SHOULDER IMPLANT Right 2019    Procedure: RIGHT REVERSE TOTAL SHOULDER ARTHROPLASTY;  Surgeon: Dickson Noble DO;  Location: [facility];  Service: Orthopedics     [facility] REMOVAL OF OVARY(S)      Description: Oophorectomy;  Recorded: 2012;     [facility] REPAIR OF RECTOCELE      Description: Rectocele Repair;  Recorded: 2012;     [facility] TOTAL ABDOM HYSTERECTOMY      Description: Hysterectomy;  Recorded: 2012;     [facility] TOTAL KNEE ARTHROPLASTY Left 2017    Procedure: LEFT  TOTAL KNEE ARTHROPLASTY;  Surgeon: Dickson Noble DO;  Location: [facility] Main OR;  Service: Orthopedics      Allergies   Allergen Reactions     Other Environmental Allergy      Hospital bandaids cause rash and itchng     Pseudoephedrine Palpitations     Rapid heart rate     Typhoid Vaccine [Typhoid Vi Antigen] Unknown     rash      Social History     Tobacco Use     Smoking status: Former Smoker     Quit date: 5/15/1991     Years since quittin.7     Smokeless tobacco: Never Used   Substance Use Topics     Alcohol use: No      Wt Readings from Last 1 Encounters:   22 76.7 kg (169 lb 3.2 oz)        Anesthesia Evaluation   Pt has had prior anesthetic.     History of anesthetic complications  - PONV.      ROS/MED HX  ENT/Pulmonary:       Neurologic:       Cardiovascular:     (+) hypertension--CAD ---    METS/Exercise Tolerance:     Hematologic:       Musculoskeletal:       GI/Hepatic:       Renal/Genitourinary:       Endo:     (+) thyroid problem,     Psychiatric/Substance Use:       Infectious Disease:       Malignancy:       Other:            Physical Exam    Airway        Mallampati: II    Neck ROM: full     Respiratory Devices and Support         Dental  no notable dental history         Cardiovascular   cardiovascular exam normal          Pulmonary   pulmonary exam normal                OUTSIDE LABS:  CBC:   Lab Results   Component Value Date    WBC 7.5 01/15/2021    WBC 8.0 2019    HGB 14.0 01/15/2021    HGB 12.7 2019    HCT 42.9 01/15/2021    HCT 45.2 2019     01/15/2021     2019     BMP:   Lab Results   Component Value Date     2022     2022    POTASSIUM 4.8 2022    POTASSIUM 4.5 2022    CHLORIDE 103 2022    CHLORIDE 103 2022    CO2 28 2022    CO2 25 2022    BUN 19 2022    BUN 22 2022    CR 0.86 2022    CR 0.70 2022     (H) 2022     2022     COAGS:  No results found for: PTT, INR, FIBR  POC: No results found for: BGM, HCG, HCGS  HEPATIC:   Lab Results   Component Value Date    ALBUMIN 3.5 01/04/2022    PROTTOTAL 6.1 01/04/2022    ALT 9 01/04/2022    AST 12 01/04/2022    ALKPHOS 89 01/04/2022    BILITOTAL 0.5 01/04/2022     OTHER:   Lab Results   Component Value Date    ROSEMARY 9.4 02/03/2022    MAG 2.1 01/14/2020    LIPASE 189 (H) 01/04/2022    TSH 1.64 01/13/2022    T4 1.23 07/16/2021       Anesthesia Plan    ASA Status:  2      Anesthesia Type: MAC.              Consents    Anesthesia Plan(s) and associated risks, benefits, and realistic alternatives discussed. Questions answered and patient/representative(s) expressed understanding.     - Discussed: Risks, Benefits and Alternatives for the PROCEDURE were discussed     - Discussed with:  Patient         Postoperative Care    Pain management: Multi-modal analgesia.        Comments:                Amanda Monaco MD

## 2022-02-07 NOTE — ANESTHESIA CARE TRANSFER NOTE
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    Anesthesia Type:   MAC     Note:    Oropharynx: oropharynx clear of all foreign objects and spontaneously breathing  Level of Consciousness: drowsy  Oxygen Supplementation: face mask  Level of Supplemental Oxygen (L/min / FiO2): 6  Independent Airway: airway patency satisfactory and stable  Dentition: dentition unchanged  Vital Signs Stable: post-procedure vital signs reviewed and stable  Report to RN Given: handoff report given  Patient transferred to: Phase II    Handoff Report: Identifed the Patient, Identified the Reponsible Provider, Reviewed the pertinent medical history, Discussed the surgical course, Reviewed Intra-OP anesthesia mangement and issues during anesthesia, Set expectations for post-procedure period and Allowed opportunity for questions and acknowledgement of understanding      Vitals:  Vitals Value Taken Time   BP     Temp     Pulse     Resp     SpO2         Electronically Signed By: JANN Olguin CRNA  February 7, 2022  11:54 AM

## 2022-02-08 LAB
PATH REPORT.COMMENTS IMP SPEC: NORMAL
PATH REPORT.COMMENTS IMP SPEC: NORMAL
PATH REPORT.FINAL DX SPEC: NORMAL
PATH REPORT.GROSS SPEC: NORMAL
PATH REPORT.MICROSCOPIC SPEC OTHER STN: NORMAL
PATH REPORT.RELEVANT HX SPEC: NORMAL
PHOTO IMAGE: NORMAL

## 2022-02-08 PROCEDURE — 88342 IMHCHEM/IMCYTCHM 1ST ANTB: CPT | Mod: 26 | Performed by: PATHOLOGY

## 2022-02-08 PROCEDURE — 88305 TISSUE EXAM BY PATHOLOGIST: CPT | Mod: 26 | Performed by: PATHOLOGY

## 2022-04-04 ENCOUNTER — LAB REQUISITION (OUTPATIENT)
Dept: LAB | Facility: CLINIC | Age: 77
End: 2022-04-04
Payer: MEDICARE

## 2022-04-04 DIAGNOSIS — M80.00XD AGE-RELATED OSTEOPOROSIS WITH CURRENT PATHOLOGICAL FRACTURE, UNSPECIFIED SITE, SUBSEQUENT ENCOUNTER FOR FRACTURE WITH ROUTINE HEALING: ICD-10-CM

## 2022-04-04 LAB
ANION GAP SERPL CALCULATED.3IONS-SCNC: 10 MMOL/L (ref 5–18)
BUN SERPL-MCNC: 21 MG/DL (ref 8–28)
CALCIUM SERPL-MCNC: 9.3 MG/DL (ref 8.5–10.5)
CHLORIDE BLD-SCNC: 107 MMOL/L (ref 98–107)
CO2 SERPL-SCNC: 25 MMOL/L (ref 22–31)
CREAT SERPL-MCNC: 0.87 MG/DL (ref 0.6–1.1)
GFR SERPL CREATININE-BSD FRML MDRD: 68 ML/MIN/1.73M2
GLUCOSE BLD-MCNC: 128 MG/DL (ref 70–125)
POTASSIUM BLD-SCNC: 4.5 MMOL/L (ref 3.5–5)
SODIUM SERPL-SCNC: 142 MMOL/L (ref 136–145)

## 2022-04-04 PROCEDURE — 82306 VITAMIN D 25 HYDROXY: CPT | Mod: ORL | Performed by: FAMILY MEDICINE

## 2022-04-04 PROCEDURE — 80048 BASIC METABOLIC PNL TOTAL CA: CPT | Mod: ORL | Performed by: FAMILY MEDICINE

## 2022-04-05 LAB — DEPRECATED CALCIDIOL+CALCIFEROL SERPL-MC: 49 UG/L (ref 20–75)

## 2022-04-20 ENCOUNTER — LAB REQUISITION (OUTPATIENT)
Dept: LAB | Facility: CLINIC | Age: 77
End: 2022-04-20
Payer: MEDICARE

## 2022-04-20 DIAGNOSIS — M80.00XD AGE-RELATED OSTEOPOROSIS WITH CURRENT PATHOLOGICAL FRACTURE, UNSPECIFIED SITE, SUBSEQUENT ENCOUNTER FOR FRACTURE WITH ROUTINE HEALING: ICD-10-CM

## 2022-04-20 LAB — CALCIUM SERPL-MCNC: 9.4 MG/DL (ref 8.5–10.5)

## 2022-04-20 PROCEDURE — 82306 VITAMIN D 25 HYDROXY: CPT | Mod: ORL | Performed by: FAMILY MEDICINE

## 2022-04-20 PROCEDURE — 82310 ASSAY OF CALCIUM: CPT | Mod: ORL | Performed by: FAMILY MEDICINE

## 2022-04-21 LAB — DEPRECATED CALCIDIOL+CALCIFEROL SERPL-MC: 49 UG/L (ref 20–75)

## 2022-06-27 ENCOUNTER — TRANSFERRED RECORDS (OUTPATIENT)
Dept: HEALTH INFORMATION MANAGEMENT | Facility: CLINIC | Age: 77
End: 2022-06-27

## 2022-06-27 ENCOUNTER — LAB REQUISITION (OUTPATIENT)
Dept: LAB | Facility: CLINIC | Age: 77
End: 2022-06-27
Payer: MEDICARE

## 2022-06-27 DIAGNOSIS — E89.0 POSTPROCEDURAL HYPOTHYROIDISM: ICD-10-CM

## 2022-06-27 LAB — TSH SERPL DL<=0.005 MIU/L-ACNC: 4.72 UIU/ML (ref 0.3–5)

## 2022-06-27 PROCEDURE — 84443 ASSAY THYROID STIM HORMONE: CPT | Mod: ORL | Performed by: FAMILY MEDICINE

## 2022-07-06 ENCOUNTER — MEDICAL CORRESPONDENCE (OUTPATIENT)
Dept: HEALTH INFORMATION MANAGEMENT | Facility: CLINIC | Age: 77
End: 2022-07-06

## 2022-08-22 ENCOUNTER — ANCILLARY PROCEDURE (OUTPATIENT)
Dept: MAMMOGRAPHY | Facility: CLINIC | Age: 77
End: 2022-08-22
Attending: FAMILY MEDICINE
Payer: MEDICARE

## 2022-08-22 DIAGNOSIS — Z12.31 VISIT FOR SCREENING MAMMOGRAM: ICD-10-CM

## 2022-08-22 PROCEDURE — 77067 SCR MAMMO BI INCL CAD: CPT

## 2022-10-10 ENCOUNTER — LAB REQUISITION (OUTPATIENT)
Dept: LAB | Facility: CLINIC | Age: 77
End: 2022-10-10
Payer: MEDICARE

## 2022-10-10 DIAGNOSIS — E55.9 VITAMIN D DEFICIENCY, UNSPECIFIED: ICD-10-CM

## 2022-10-10 LAB — CALCIUM SERPL-MCNC: 8.4 MG/DL (ref 8.8–10.2)

## 2022-10-10 PROCEDURE — 82306 VITAMIN D 25 HYDROXY: CPT | Mod: ORL | Performed by: FAMILY MEDICINE

## 2022-10-10 PROCEDURE — 82310 ASSAY OF CALCIUM: CPT | Mod: ORL | Performed by: FAMILY MEDICINE

## 2022-10-11 LAB — DEPRECATED CALCIDIOL+CALCIFEROL SERPL-MC: 38 UG/L (ref 20–75)

## 2022-10-26 ENCOUNTER — LAB REQUISITION (OUTPATIENT)
Dept: LAB | Facility: CLINIC | Age: 77
End: 2022-10-26
Payer: MEDICARE

## 2022-10-26 DIAGNOSIS — E55.9 VITAMIN D DEFICIENCY, UNSPECIFIED: ICD-10-CM

## 2022-10-26 LAB — CALCIUM SERPL-MCNC: 9.3 MG/DL (ref 8.8–10.2)

## 2022-10-26 PROCEDURE — 82310 ASSAY OF CALCIUM: CPT | Mod: ORL | Performed by: FAMILY MEDICINE

## 2022-10-26 PROCEDURE — 82306 VITAMIN D 25 HYDROXY: CPT | Mod: ORL | Performed by: FAMILY MEDICINE

## 2022-10-27 LAB — DEPRECATED CALCIDIOL+CALCIFEROL SERPL-MC: 35 UG/L (ref 20–75)

## 2023-03-14 ENCOUNTER — LAB REQUISITION (OUTPATIENT)
Dept: LAB | Facility: CLINIC | Age: 78
End: 2023-03-14
Payer: MEDICARE

## 2023-03-14 PROCEDURE — U0003 INFECTIOUS AGENT DETECTION BY NUCLEIC ACID (DNA OR RNA); SEVERE ACUTE RESPIRATORY SYNDROME CORONAVIRUS 2 (SARS-COV-2) (CORONAVIRUS DISEASE [COVID-19]), AMPLIFIED PROBE TECHNIQUE, MAKING USE OF HIGH THROUGHPUT TECHNOLOGIES AS DESCRIBED BY CMS-2020-01-R: HCPCS | Mod: ORL | Performed by: PHYSICIAN ASSISTANT

## 2023-03-15 LAB — SARS-COV-2 RNA RESP QL NAA+PROBE: NEGATIVE

## 2023-04-18 ENCOUNTER — LAB REQUISITION (OUTPATIENT)
Dept: LAB | Facility: CLINIC | Age: 78
End: 2023-04-18
Payer: MEDICARE

## 2023-04-18 DIAGNOSIS — M81.0 AGE-RELATED OSTEOPOROSIS WITHOUT CURRENT PATHOLOGICAL FRACTURE: ICD-10-CM

## 2023-04-18 DIAGNOSIS — E55.9 VITAMIN D DEFICIENCY, UNSPECIFIED: ICD-10-CM

## 2023-04-18 LAB — CALCIUM SERPL-MCNC: 9.4 MG/DL (ref 8.8–10.2)

## 2023-04-18 PROCEDURE — 82310 ASSAY OF CALCIUM: CPT | Mod: ORL | Performed by: FAMILY MEDICINE

## 2023-04-18 PROCEDURE — 82306 VITAMIN D 25 HYDROXY: CPT | Mod: ORL | Performed by: FAMILY MEDICINE

## 2023-04-19 LAB — DEPRECATED CALCIDIOL+CALCIFEROL SERPL-MC: 40 UG/L (ref 20–75)

## 2023-05-03 ENCOUNTER — LAB REQUISITION (OUTPATIENT)
Dept: LAB | Facility: CLINIC | Age: 78
End: 2023-05-03
Payer: MEDICARE

## 2023-05-03 DIAGNOSIS — E55.9 VITAMIN D DEFICIENCY, UNSPECIFIED: ICD-10-CM

## 2023-05-03 DIAGNOSIS — M81.0 AGE-RELATED OSTEOPOROSIS WITHOUT CURRENT PATHOLOGICAL FRACTURE: ICD-10-CM

## 2023-05-03 LAB — CALCIUM SERPL-MCNC: 9 MG/DL (ref 8.8–10.2)

## 2023-05-03 PROCEDURE — 82306 VITAMIN D 25 HYDROXY: CPT | Mod: ORL | Performed by: FAMILY MEDICINE

## 2023-05-03 PROCEDURE — 82310 ASSAY OF CALCIUM: CPT | Mod: ORL | Performed by: FAMILY MEDICINE

## 2023-05-04 LAB — DEPRECATED CALCIDIOL+CALCIFEROL SERPL-MC: 37 UG/L (ref 20–75)

## 2023-06-08 ENCOUNTER — LAB REQUISITION (OUTPATIENT)
Dept: LAB | Facility: CLINIC | Age: 78
End: 2023-06-08
Payer: MEDICARE

## 2023-06-08 DIAGNOSIS — E11.9 TYPE 2 DIABETES MELLITUS WITHOUT COMPLICATIONS (H): ICD-10-CM

## 2023-06-08 DIAGNOSIS — E78.5 HYPERLIPIDEMIA, UNSPECIFIED: ICD-10-CM

## 2023-06-08 DIAGNOSIS — E89.0 POSTPROCEDURAL HYPOTHYROIDISM: ICD-10-CM

## 2023-06-08 PROCEDURE — 80061 LIPID PANEL: CPT | Mod: ORL | Performed by: FAMILY MEDICINE

## 2023-06-08 PROCEDURE — 83735 ASSAY OF MAGNESIUM: CPT | Mod: ORL | Performed by: FAMILY MEDICINE

## 2023-06-08 PROCEDURE — 80048 BASIC METABOLIC PNL TOTAL CA: CPT | Mod: ORL | Performed by: FAMILY MEDICINE

## 2023-06-08 PROCEDURE — 84443 ASSAY THYROID STIM HORMONE: CPT | Mod: ORL | Performed by: FAMILY MEDICINE

## 2023-06-09 LAB
ANION GAP SERPL CALCULATED.3IONS-SCNC: 12 MMOL/L (ref 7–15)
BUN SERPL-MCNC: 19.8 MG/DL (ref 8–23)
CALCIUM SERPL-MCNC: 9.3 MG/DL (ref 8.8–10.2)
CHLORIDE SERPL-SCNC: 103 MMOL/L (ref 98–107)
CHOLEST SERPL-MCNC: 139 MG/DL
CREAT SERPL-MCNC: 0.75 MG/DL (ref 0.51–0.95)
DEPRECATED HCO3 PLAS-SCNC: 25 MMOL/L (ref 22–29)
GFR SERPL CREATININE-BSD FRML MDRD: 81 ML/MIN/1.73M2
GLUCOSE SERPL-MCNC: 144 MG/DL (ref 70–99)
HDLC SERPL-MCNC: 69 MG/DL
LDLC SERPL CALC-MCNC: 52 MG/DL
MAGNESIUM SERPL-MCNC: 2.1 MG/DL (ref 1.7–2.3)
NONHDLC SERPL-MCNC: 70 MG/DL
POTASSIUM SERPL-SCNC: 4.8 MMOL/L (ref 3.4–5.3)
SODIUM SERPL-SCNC: 140 MMOL/L (ref 136–145)
TRIGL SERPL-MCNC: 88 MG/DL
TSH SERPL DL<=0.005 MIU/L-ACNC: 2.05 UIU/ML (ref 0.3–4.2)

## 2023-08-25 ENCOUNTER — ANCILLARY PROCEDURE (OUTPATIENT)
Dept: MAMMOGRAPHY | Facility: CLINIC | Age: 78
End: 2023-08-25
Attending: FAMILY MEDICINE
Payer: MEDICARE

## 2023-08-25 DIAGNOSIS — Z12.31 VISIT FOR SCREENING MAMMOGRAM: ICD-10-CM

## 2023-08-25 PROCEDURE — 77067 SCR MAMMO BI INCL CAD: CPT

## 2023-11-14 ENCOUNTER — LAB REQUISITION (OUTPATIENT)
Dept: LAB | Facility: CLINIC | Age: 78
End: 2023-11-14
Payer: MEDICARE

## 2023-11-14 DIAGNOSIS — M81.0 AGE-RELATED OSTEOPOROSIS WITHOUT CURRENT PATHOLOGICAL FRACTURE: ICD-10-CM

## 2023-11-14 DIAGNOSIS — E55.9 VITAMIN D DEFICIENCY, UNSPECIFIED: ICD-10-CM

## 2023-11-14 PROCEDURE — 82310 ASSAY OF CALCIUM: CPT | Mod: ORL | Performed by: FAMILY MEDICINE

## 2023-11-14 PROCEDURE — 82306 VITAMIN D 25 HYDROXY: CPT | Mod: ORL | Performed by: FAMILY MEDICINE

## 2023-11-15 LAB
CALCIUM SERPL-MCNC: 9.2 MG/DL (ref 8.8–10.2)
VIT D+METAB SERPL-MCNC: 35 NG/ML (ref 20–50)

## 2023-12-01 ENCOUNTER — LAB REQUISITION (OUTPATIENT)
Dept: LAB | Facility: CLINIC | Age: 78
End: 2023-12-01
Payer: MEDICARE

## 2023-12-01 DIAGNOSIS — E55.9 VITAMIN D DEFICIENCY, UNSPECIFIED: ICD-10-CM

## 2023-12-01 PROCEDURE — 82310 ASSAY OF CALCIUM: CPT | Mod: ORL | Performed by: FAMILY MEDICINE

## 2023-12-01 PROCEDURE — 82306 VITAMIN D 25 HYDROXY: CPT | Mod: ORL | Performed by: FAMILY MEDICINE

## 2023-12-02 LAB
CALCIUM SERPL-MCNC: 9.5 MG/DL (ref 8.8–10.2)
VIT D+METAB SERPL-MCNC: 35 NG/ML (ref 20–50)

## 2024-06-20 ENCOUNTER — LAB REQUISITION (OUTPATIENT)
Dept: LAB | Facility: CLINIC | Age: 79
End: 2024-06-20
Payer: MEDICARE

## 2024-06-20 DIAGNOSIS — E11.9 TYPE 2 DIABETES MELLITUS WITHOUT COMPLICATIONS (H): ICD-10-CM

## 2024-06-20 DIAGNOSIS — E89.0 POSTPROCEDURAL HYPOTHYROIDISM: ICD-10-CM

## 2024-06-20 LAB
ALBUMIN SERPL BCG-MCNC: 4.2 G/DL (ref 3.5–5.2)
ALP SERPL-CCNC: 66 U/L (ref 40–150)
ALT SERPL W P-5'-P-CCNC: <5 U/L (ref 0–50)
ANION GAP SERPL CALCULATED.3IONS-SCNC: 12 MMOL/L (ref 7–15)
AST SERPL W P-5'-P-CCNC: 15 U/L (ref 0–45)
BILIRUB SERPL-MCNC: 0.6 MG/DL
BUN SERPL-MCNC: 24.1 MG/DL (ref 8–23)
CALCIUM SERPL-MCNC: 9.5 MG/DL (ref 8.8–10.2)
CHLORIDE SERPL-SCNC: 101 MMOL/L (ref 98–107)
CREAT SERPL-MCNC: 0.86 MG/DL (ref 0.51–0.95)
CREAT UR-MCNC: 116 MG/DL
DEPRECATED HCO3 PLAS-SCNC: 25 MMOL/L (ref 22–29)
EGFRCR SERPLBLD CKD-EPI 2021: 68 ML/MIN/1.73M2
GLUCOSE SERPL-MCNC: 133 MG/DL (ref 70–99)
MICROALBUMIN UR-MCNC: <12 MG/L
MICROALBUMIN/CREAT UR: NORMAL MG/G{CREAT}
POTASSIUM SERPL-SCNC: 4.6 MMOL/L (ref 3.4–5.3)
PROT SERPL-MCNC: 6.9 G/DL (ref 6.4–8.3)
SODIUM SERPL-SCNC: 138 MMOL/L (ref 135–145)
T4 FREE SERPL-MCNC: 1.4 NG/DL (ref 0.9–1.7)
TSH SERPL DL<=0.005 MIU/L-ACNC: 4.71 UIU/ML (ref 0.3–4.2)

## 2024-06-20 PROCEDURE — 84443 ASSAY THYROID STIM HORMONE: CPT | Mod: ORL | Performed by: FAMILY MEDICINE

## 2024-06-20 PROCEDURE — 84439 ASSAY OF FREE THYROXINE: CPT | Mod: ORL | Performed by: FAMILY MEDICINE

## 2024-06-20 PROCEDURE — 80053 COMPREHEN METABOLIC PANEL: CPT | Mod: ORL | Performed by: FAMILY MEDICINE

## 2024-06-20 PROCEDURE — 82570 ASSAY OF URINE CREATININE: CPT | Mod: ORL | Performed by: FAMILY MEDICINE

## 2024-10-03 ENCOUNTER — LAB REQUISITION (OUTPATIENT)
Dept: LAB | Facility: CLINIC | Age: 79
End: 2024-10-03
Payer: MEDICARE

## 2024-10-03 DIAGNOSIS — E89.0 POSTPROCEDURAL HYPOTHYROIDISM: ICD-10-CM

## 2024-10-03 PROCEDURE — 84443 ASSAY THYROID STIM HORMONE: CPT | Mod: ORL | Performed by: FAMILY MEDICINE

## 2024-10-04 LAB — TSH SERPL DL<=0.005 MIU/L-ACNC: 0.56 UIU/ML (ref 0.3–4.2)

## 2024-10-11 ENCOUNTER — ANCILLARY PROCEDURE (OUTPATIENT)
Dept: MAMMOGRAPHY | Facility: HOSPITAL | Age: 79
End: 2024-10-11
Attending: SOCIAL WORKER
Payer: MEDICARE

## 2024-10-11 DIAGNOSIS — Z12.31 VISIT FOR SCREENING MAMMOGRAM: ICD-10-CM

## 2024-10-11 PROCEDURE — 77063 BREAST TOMOSYNTHESIS BI: CPT

## 2024-11-06 ENCOUNTER — TRANSFERRED RECORDS (OUTPATIENT)
Dept: HEALTH INFORMATION MANAGEMENT | Facility: CLINIC | Age: 79
End: 2024-11-06
Payer: MEDICARE

## 2024-11-07 ENCOUNTER — TRANSCRIBE ORDERS (OUTPATIENT)
Dept: OTHER | Age: 79
End: 2024-11-07

## 2024-11-20 ENCOUNTER — HOSPITAL ENCOUNTER (OUTPATIENT)
Dept: NUCLEAR MEDICINE | Facility: HOSPITAL | Age: 79
Discharge: HOME | End: 2024-11-20
Attending: FAMILY MEDICINE
Payer: MEDICARE

## 2024-11-20 ENCOUNTER — HOSPITAL ENCOUNTER (OUTPATIENT)
Dept: CARDIOLOGY | Facility: HOSPITAL | Age: 79
Discharge: HOME | End: 2024-11-20
Attending: FAMILY MEDICINE
Payer: MEDICARE

## 2024-11-20 DIAGNOSIS — R06.09 DYSPNEA ON EXERTION: ICD-10-CM

## 2024-11-20 LAB
CV STRESS CURRENT BP HE: NORMAL
CV STRESS CURRENT HR HE: 110
CV STRESS CURRENT HR HE: 112
CV STRESS CURRENT HR HE: 76
CV STRESS CURRENT HR HE: 79
CV STRESS CURRENT HR HE: 79
CV STRESS CURRENT HR HE: 83
CV STRESS CURRENT HR HE: 83
CV STRESS CURRENT HR HE: 87
CV STRESS CURRENT HR HE: 88
CV STRESS CURRENT HR HE: 89
CV STRESS CURRENT HR HE: 90
CV STRESS CURRENT HR HE: 91
CV STRESS CURRENT HR HE: 91
CV STRESS CURRENT HR HE: 93
CV STRESS CURRENT HR HE: 94
CV STRESS CURRENT HR HE: 96
CV STRESS DEVIATION TIME HE: NORMAL
CV STRESS ECHO PERCENT HR HE: NORMAL
CV STRESS EXERCISE STAGE HE: NORMAL
CV STRESS FINAL RESTING BP HE: NORMAL
CV STRESS FINAL RESTING HR HE: 91
CV STRESS MAX HR HE: 110
CV STRESS MAX TREADMILL GRADE HE: 0
CV STRESS MAX TREADMILL SPEED HE: 0
CV STRESS PEAK DIA BP HE: NORMAL
CV STRESS PEAK SYS BP HE: NORMAL
CV STRESS PHASE HE: NORMAL
CV STRESS PROTOCOL HE: NORMAL
CV STRESS RESTING PT POSITION HE: NORMAL
CV STRESS ST DEVIATION AMOUNT HE: NORMAL
CV STRESS ST DEVIATION ELEVATION HE: NORMAL
CV STRESS ST EVELATION AMOUNT HE: NORMAL
CV STRESS TEST TYPE HE: NORMAL
CV STRESS TOTAL STAGE TIME MIN 1 HE: NORMAL
NUC STRESS EJECTION FRACTION: 67 %
RATE PRESSURE PRODUCT: NORMAL
STRESS ECHO BASELINE DIASTOLIC HE: 70
STRESS ECHO BASELINE HR: 80
STRESS ECHO BASELINE SYSTOLIC BP: 159
STRESS ECHO CALCULATED PERCENT HR: 79 %
STRESS ECHO LAST STRESS DIASTOLIC BP: 66
STRESS ECHO LAST STRESS HR: 91
STRESS ECHO LAST STRESS SYSTOLIC BP: 119
STRESS ECHO TARGET HR: 139

## 2024-11-20 PROCEDURE — 250N000011 HC RX IP 250 OP 636: Performed by: FAMILY MEDICINE

## 2024-11-20 PROCEDURE — A9500 TC99M SESTAMIBI: HCPCS | Performed by: FAMILY MEDICINE

## 2024-11-20 PROCEDURE — 343N000001 HC RX 343 MED OP 636: Performed by: FAMILY MEDICINE

## 2024-11-20 PROCEDURE — 93017 CV STRESS TEST TRACING ONLY: CPT

## 2024-11-20 PROCEDURE — 78452 HT MUSCLE IMAGE SPECT MULT: CPT

## 2024-11-20 RX ORDER — REGADENOSON 0.08 MG/ML
0.4 INJECTION, SOLUTION INTRAVENOUS ONCE
Status: COMPLETED | OUTPATIENT
Start: 2024-11-20 | End: 2024-11-20

## 2024-11-20 RX ORDER — ASPIRIN 81 MG
1 TABLET, DELAYED RELEASE (ENTERIC COATED) ORAL DAILY
COMMUNITY

## 2024-11-20 RX ORDER — AMINOPHYLLINE 25 MG/ML
50-100 INJECTION, SOLUTION INTRAVENOUS
Status: DISCONTINUED | OUTPATIENT
Start: 2024-11-20 | End: 2024-11-21 | Stop reason: HOSPADM

## 2024-11-20 RX ADMIN — REGADENOSON 0.4 MG: 0.08 INJECTION, SOLUTION INTRAVENOUS at 10:17

## 2024-11-20 RX ADMIN — Medication 8.7 MILLICURIE: at 09:01

## 2024-11-20 RX ADMIN — Medication 31.1 MILLICURIE: at 10:15

## 2024-11-20 NOTE — PROGRESS NOTES
Radha came in for Lexiscan stress test today. ECG is irregular and appears to be atrial fibrillation. Writer called PCP clinic (Dr. Colon) so they can be made aware. Patient HR is in the 70s, /70, and patient is asymptomatic except GAVIRIA for the last few years.   Lexiscan stress test complete, and patient tolerated well.

## 2024-11-22 NOTE — PROGRESS NOTES
HEART CARE OUT-PATIENT CONSULTATON NOTE      [facility]  [phone]      Assessment/Recommendations   Assessment: 79 year old female with dyspnea on exertion, abnormal stress    Plan:  Dyspnea on exertion, abnormal stress test, unstable angina   Reviewed symptoms, test findings, symptoms likely to represent unstable angina, thogh could also be from atrial fibrillation, see below       -Angiogram       -Rest echocardiogram     Atrial fibrillation, persistent    Reviewed diagnosis, etiology, treatment strategies including rate vs rhythm control, and stroke risk ([id number]-[id number] = 4).  She has some exertional dyspnea that is likely CAD but also could be from the atrial fibrillation       -She will check cost of other DOACs and let us know, if she stays on Eliquis will increase dose to 5mg BID      -3 day Zio, if HR high can add beta blockers       -Rest echocardiogram as above     HTN  Well controlled      -Continue Lisinopril 10mg, Norvasc 10mg    Hyperlipidemia   LDL = 52      -Continue Lipitor 20mg, if CAD on angiogram can increase to 40mg     The longitudinal plan of care for the diagnosis(es)/condition(s) as documented were addressed during this visit. Due to the added complexity in care, I will continue to support Radha in the subsequent management and with ongoing continuity of care.          History of Present Illness/Subjective    Indication for Consult:  I was asked to see Radha GARDNER [place] by Dr Zee Colon for abnormal stress test      HPI: Radha Tatum is a 79 year old female with a history of HTN, hyperlipidemia who presents for evaluation of abnormal stress test.  She saw PCP and had stress test advised due to dyspnea on exertion that showed anterior and lateral ischemia, ECG noted atrial fibrillation, no prior diagnosis of arrhythmia.  She was advised to see cardiology.    She reports dyspnea with steps for quite some time or walking a distance on the level.  No chest pain, no  orthopnea or PND.  Sometimes feels dizzy for a day or so and then gone.  Heart feels like it races when nervous. She is extremely anxious during our visit and has a hard time understanding our discussion or making decisions.      I reviewed notes from PCP prior to this visit.         Physical Examination  Past Cardiac History   Vitals: /78 (BP Location: Right arm, Patient Position: Sitting, Cuff Size: Adult Regular)   Pulse 78   Resp 14   Wt 76.7 kg (169 lb)   BMI 31.93 kg/m    BMI= Body mass index is 31.93 kg/m .  Wt Readings from Last 3 Encounters:   11/26/24 76.7 kg (169 lb)   02/07/22 76.7 kg (169 lb 3.2 oz)   09/16/19 85.3 kg (188 lb)       General Appearance:   no distress, normal body habitus   ENT/Mouth: membranes moist, no oral lesions or bleeding gums.      EYES:  no scleral icterus, normal conjunctivae   Neck: no carotid bruits or thyromegaly   Chest/Lungs:   lungs are clear to auscultation, no rales or wheezing,  sternal scar, equal chest wall expansion    Cardiovascular:   Irregular. Normal first and second heart sounds with no murmurs, rubs, or gallops; the carotid, radial and posterior tibial pulses are intact, Jugular venous pressure normal, no edema bilaterally    Abdomen:  no organomegaly, masses, bruits, or tenderness; bowel sounds are present   Extremities: no cyanosis or clubbing   Skin: no xanthelasma, warm.    Neurologic: normal  bilateral, no tremors           None    Most Recent Echocardiogram: None    Most Recent Stress Test: 11/20/2024    Lexiscan stress ECG negative for ischemia.    The nuclear stress test is abnormal.  There is a medium size area of moderate ischemia involving the mid to distal anterior and anterolateral wall.    The left ventricular ejection fraction at stress is 67%.    The patient is at an intermediate risk of future cardiac ischemic events.    There is no prior study for comparison.    Most Recent Angiogram: None    ECG (reviewed by myself): 11/26/2024  atrial fibrillation 87 bpm           Medical History  Family History Social History   Past Medical History:   Diagnosis Date    Arthritis     Bradycardia     Cataract     Coronary artery disease     required no intervention    Degenerative joint disease (DJD) of hip     Disease of thyroid gland     Glaucoma     Hearing loss     History of Spanish measles     Hyperlipidemia     Hypertension     Hypothyroidism     Macular degeneration (senile) of retina     Motion sickness     Multinodular goiter     Osteoporosis     PONV (postoperative nausea and vomiting)      Family History   Problem Relation Age of Onset    Chronic Obstructive Pulmonary Disease Mother     Diabetes Mother     Liver Cancer Father     Anesthesia Reaction No family hx of     Clotting Disorder No family hx of        Brother with MI  Social History     Socioeconomic History    Marital status:      Spouse name: Not on file    Number of children: Not on file    Years of education: Not on file    Highest education level: Not on file   Occupational History    Not on file   Tobacco Use    Smoking status: Former     Current packs/day: 0.00     Types: Cigarettes     Quit date: 5/15/1991     Years since quittin.5    Smokeless tobacco: Never   Substance and Sexual Activity    Alcohol use: No    Drug use: No    Sexual activity: Not on file   Other Topics Concern    Not on file   Social History Narrative    Not on file     Social Drivers of Health     Financial Resource Strain: High Risk (2022)    [facility] [employer] [facility]    Financial Resource Strain     Difficulty of Paying Living Expenses: Not on file     Difficulty of Paying Living Expenses: Not on file   Food Insecurity: Not on file   Transportation Needs: Not on file   Physical Activity: Not on file   Stress: Not on file   Social Connections: Unknown (2022)    Received from [employer]  [facility]    Social Connections     Frequency of Communication with Friends and Family: Not on file   Interpersonal Safety: Not on file   Housing Stability: Not on file           Medications  Allergies   Current Outpatient Medications   Medication Sig Dispense Refill    acetaminophen (TYLENOL) 500 MG tablet [ACETAMINOPHEN (TYLENOL) 500 MG TABLET] Take 2 tablets (1,000 mg total) by mouth 3 (three) times a day.  0    amLODIPine (NORVASC) 10 MG tablet [AMLODIPINE (NORVASC) 10 MG TABLET] Take 10 mg by mouth daily.      aspirin 81 MG EC tablet Take 81 mg by mouth daily      atorvastatin (LIPITOR) 20 MG tablet [ATORVASTATIN (LIPITOR) 20 MG TABLET] Take 20 mg by mouth bedtime.       Calcium Carb-Cholecalciferol (CALCIUM 600 + D) 600-5 MG-MCG TABS Take 1 tablet by mouth daily.      chlorpheniramine (CHLOR-TRIMETON) 4 mg tablet [CHLORPHENIRAMINE (CHLOR-TRIMETON) 4 MG TABLET] Take 4 mg by mouth every 6 (six) hours as needed for allergies.      levothyroxine (SYNTHROID, LEVOTHROID) 100 MCG tablet Take 112 mcg by mouth daily.  3    lisinopril (PRINIVIL,ZESTRIL) 10 MG tablet [LISINOPRIL (PRINIVIL,ZESTRIL) 10 MG TABLET] Take 10 mg by mouth daily.      naproxen (NAPROSYN) 500 MG tablet Take 500 mg by mouth 2 times daily as needed for moderate pain      timolol maleate (TIMOPTIC) 0.5 % ophthalmic solution [TIMOLOL MALEATE (TIMOPTIC) 0.5 % OPHTHALMIC SOLUTION] Administer 1 drop into the left eye daily.      vit C,Y-Ma-nsiny-lutein-zeaxan (PRESERVISION AREDS 2) 284-918-91-1 mg-unit-mg-mg cap [VIT C,F-MW-FNIHK-LUTEIN-ZEAXAN (PRESERVISION AREDS 2) 772-753-57-1 MG-UNIT-MG-MG CAP] Take 1 capsule by mouth 2 (two) times a day.      nitroglycerin (NITROSTAT) 0.4 MG SL tablet [NITROGLYCERIN (NITROSTAT) 0.4 MG SL TABLET] Place 0.4 mg under the tongue every 5 (five) minutes as needed for chest pain (has never used). (Patient not taking: Reported on 11/26/2024)      omeprazole (PRILOSEC) 40 MG DR  "capsule Take 1 capsule (40 mg) by mouth 2 times daily (Patient not taking: Reported on 11/26/2024) 60 capsule 3    polyethylene glycol (MIRALAX) 17 gram packet [POLYETHYLENE GLYCOL (MIRALAX) 17 GRAM PACKET] Take 1 packet (17 g total) by mouth daily as needed. (Patient not taking: Reported on 11/26/2024)  0       Allergies   Allergen Reactions    Other Environmental Allergy      Hospital bandaids cause rash and itchng    Pseudoephedrine Palpitations     Rapid heart rate    Typhoid Vaccine [Typhoid Vi Polysaccharide Vaccine] Unknown     rash          Lab Results    Chemistry/lipid CBC Cardiac Enzymes/BNP/TSH/INR   Recent Labs   Lab Test 06/08/23  1318   CHOL 139   HDL 69   LDL 52   TRIG 88     Recent Labs   Lab Test 06/08/23  1318 01/13/22  1352 07/10/20  1217   LDL 52 68 76     Recent Labs   Lab Test 06/20/24  1117      POTASSIUM 4.6   CHLORIDE 101   CO2 25   *   BUN 24.1*   CR 0.86   GFRESTIMATED 68   ROSEMARY 9.5     Recent Labs   Lab Test 06/20/24  1117 06/08/23  1343 04/04/22  1321   CR 0.86 0.75 0.87     No results for input(s): \"A1C\" in the last 70516 hours.       Recent Labs   Lab Test 01/15/21  1227   WBC 7.5   HGB 14.0   HCT 42.9   MCV 92        Recent Labs   Lab Test 01/15/21  1227 09/16/19  1101 09/16/19  0605   HGB 14.0 12.7 15.1    No results for input(s): \"TROPONINI\" in the last 41454 hours.  No results for input(s): \"BNP\", \"NTBNPI\", \"NTBNP\" in the last 31007 hours.  Recent Labs   Lab Test 10/03/24  1103   TSH 0.56     No results for input(s): \"INR\" in the last 36405 hours.     Lyndsay Mae MD  Noninvasive Cardiologist   [facility]                                    "

## 2024-11-26 ENCOUNTER — ORDERS ONLY (AUTO-RELEASED) (OUTPATIENT)
Dept: CARDIOLOGY | Facility: CLINIC | Age: 79
End: 2024-11-26

## 2024-11-26 ENCOUNTER — OFFICE VISIT (OUTPATIENT)
Dept: CARDIOLOGY | Facility: CLINIC | Age: 79
End: 2024-11-26
Payer: MEDICARE

## 2024-11-26 ENCOUNTER — DOCUMENTATION ONLY (OUTPATIENT)
Dept: CARDIOLOGY | Facility: CLINIC | Age: 79
End: 2024-11-26

## 2024-11-26 ENCOUNTER — PREP FOR PROCEDURE (OUTPATIENT)
Dept: CARDIOLOGY | Facility: CLINIC | Age: 79
End: 2024-11-26

## 2024-11-26 VITALS
HEART RATE: 78 BPM | WEIGHT: 169 LBS | RESPIRATION RATE: 14 BRPM | SYSTOLIC BLOOD PRESSURE: 133 MMHG | BODY MASS INDEX: 31.93 KG/M2 | DIASTOLIC BLOOD PRESSURE: 78 MMHG

## 2024-11-26 DIAGNOSIS — I48.19 PERSISTENT ATRIAL FIBRILLATION (H): ICD-10-CM

## 2024-11-26 DIAGNOSIS — I10 PRIMARY HYPERTENSION: ICD-10-CM

## 2024-11-26 DIAGNOSIS — R94.39 ABNORMAL STRESS TEST: ICD-10-CM

## 2024-11-26 DIAGNOSIS — I20.0 UNSTABLE ANGINA (H): Primary | ICD-10-CM

## 2024-11-26 DIAGNOSIS — I20.0 UNSTABLE ANGINA (H): ICD-10-CM

## 2024-11-26 DIAGNOSIS — E78.5 HYPERLIPIDEMIA LDL GOAL <100: ICD-10-CM

## 2024-11-26 DIAGNOSIS — I48.19 PERSISTENT ATRIAL FIBRILLATION (H): Primary | ICD-10-CM

## 2024-11-26 LAB
ATRIAL RATE - MUSE: 86 BPM
DIASTOLIC BLOOD PRESSURE - MUSE: NORMAL MMHG
INTERPRETATION ECG - MUSE: NORMAL
P AXIS - MUSE: NORMAL DEGREES
PR INTERVAL - MUSE: NORMAL MS
QRS DURATION - MUSE: 96 MS
QT - MUSE: 358 MS
QTC - MUSE: 430 MS
R AXIS - MUSE: 56 DEGREES
SYSTOLIC BLOOD PRESSURE - MUSE: NORMAL MMHG
T AXIS - MUSE: -30 DEGREES
VENTRICULAR RATE- MUSE: 87 BPM

## 2024-11-26 PROCEDURE — G2211 COMPLEX E/M VISIT ADD ON: HCPCS | Performed by: INTERNAL MEDICINE

## 2024-11-26 PROCEDURE — 99204 OFFICE O/P NEW MOD 45 MIN: CPT | Performed by: INTERNAL MEDICINE

## 2024-11-26 PROCEDURE — 93000 ELECTROCARDIOGRAM COMPLETE: CPT | Performed by: INTERNAL MEDICINE

## 2024-11-26 RX ORDER — ASPIRIN 325 MG
325 TABLET ORAL ONCE
OUTPATIENT
Start: 2024-11-26 | End: 2024-11-26

## 2024-11-26 RX ORDER — FENTANYL CITRATE 50 UG/ML
25 INJECTION, SOLUTION INTRAMUSCULAR; INTRAVENOUS
OUTPATIENT
Start: 2024-11-26

## 2024-11-26 RX ORDER — SODIUM CHLORIDE 9 MG/ML
INJECTION, SOLUTION INTRAVENOUS CONTINUOUS
OUTPATIENT
Start: 2024-11-26

## 2024-11-26 RX ORDER — ASPIRIN 81 MG/1
243 TABLET, CHEWABLE ORAL ONCE
OUTPATIENT
Start: 2024-11-26

## 2024-11-26 RX ORDER — LIDOCAINE 40 MG/G
CREAM TOPICAL
OUTPATIENT
Start: 2024-11-26

## 2024-11-26 NOTE — LETTER
11/26/2024    Zee Colon MD  [address] [place] [address]    RE: Radha Tatum       Dear Colleague,     [facility] the [facility].    HEART CARE OUT-PATIENT CONSULTATON NOTE      [facility]  [phone]      Assessment/Recommendations   Assessment: 79 year old female with dyspnea on exertion, abnormal stress    Plan:  Dyspnea on exertion, abnormal stress test, unstable angina   Reviewed symptoms, test findings, symptoms likely to represent unstable angina, thogh could also be from atrial fibrillation, see below       -Angiogram       -Rest echocardiogram     Atrial fibrillation, persistent    Reviewed diagnosis, etiology, treatment strategies including rate vs rhythm control, and stroke risk ([id number] = 4).  She has some exertional dyspnea that is likely CAD but also could be from the atrial fibrillation       -She will check cost of other DOACs and let us know, if she stays on Eliquis will increase dose to 5mg BID      -3 day Zio, if HR high can add beta blockers       -Rest echocardiogram as above     HTN  Well controlled      -Continue Lisinopril 10mg, Norvasc 10mg    Hyperlipidemia   LDL = 52      -Continue Lipitor 20mg, if CAD on angiogram can increase to 40mg     The longitudinal plan of care for the diagnosis(es)/condition(s) as documented were addressed during this visit. Due to the added complexity in care, I will continue to support Radha in the subsequent management and with ongoing continuity of care.          History of Present Illness/Subjective    Indication for Consult:  I was asked to see Radha Tatum by Dr Zee Colon for abnormal stress test      HPI: Radha Tatum is a 79 year old female with a history of HTN, hyperlipidemia who presents for evaluation of abnormal stress test.  She saw PCP and had stress test advised due to dyspnea on exertion that showed anterior and lateral ischemia, ECG noted  atrial fibrillation, no prior diagnosis of arrhythmia.  She was advised to see cardiology.    She reports dyspnea with steps for quite some time or walking a distance on the level.  No chest pain, no orthopnea or PND.  Sometimes feels dizzy for a day or so and then gone.  Heart feels like it races when nervous. She is extremely anxious during our visit and has a hard time understanding our discussion or making decisions.      I reviewed notes from PCP prior to this visit.         Physical Examination  Past Cardiac History   Vitals: /78 (BP Location: Right arm, Patient Position: Sitting, Cuff Size: Adult Regular)   Pulse 78   Resp 14   Wt 76.7 kg (169 lb)   BMI 31.93 kg/m    BMI= Body mass index is 31.93 kg/m .  Wt Readings from Last 3 Encounters:   11/26/24 76.7 kg (169 lb)   02/07/22 76.7 kg (169 lb 3.2 oz)   09/16/19 85.3 kg (188 lb)       General Appearance:   no distress, normal body habitus   ENT/Mouth: membranes moist, no oral lesions or bleeding gums.      EYES:  no scleral icterus, normal conjunctivae   Neck: no carotid bruits or thyromegaly   Chest/Lungs:   lungs are clear to auscultation, no rales or wheezing,  sternal scar, equal chest wall expansion    Cardiovascular:   Irregular. Normal first and second heart sounds with no murmurs, rubs, or gallops; the carotid, radial and posterior tibial pulses are intact, Jugular venous pressure normal, no edema bilaterally    Abdomen:  no organomegaly, masses, bruits, or tenderness; bowel sounds are present   Extremities: no cyanosis or clubbing   Skin: no xanthelasma, warm.    Neurologic: normal  bilateral, no tremors           None    Most Recent Echocardiogram: None    Most Recent Stress Test: 11/20/2024     Lexiscan stress ECG negative for ischemia.     The nuclear stress test is abnormal.  There is a medium size area of moderate ischemia involving the mid to distal anterior and anterolateral wall.     The left ventricular ejection fraction at  stress is 67%.     The patient is at an intermediate risk of future cardiac ischemic events.     There is no prior study for comparison.    Most Recent Angiogram: None    ECG (reviewed by myself): 2024 atrial fibrillation 87 bpm           Medical History  Family History Social History   Past Medical History:   Diagnosis Date     Arthritis      Bradycardia      Cataract      Coronary artery disease     required no intervention     Degenerative joint disease (DJD) of hip      Disease of thyroid gland      Glaucoma      Hearing loss      History of [place] measles      Hyperlipidemia      Hypertension      Hypothyroidism      Macular degeneration (senile) of retina      Motion sickness      Multinodular goiter      Osteoporosis      PONV (postoperative nausea and vomiting)      Family History   Problem Relation Age of Onset     Chronic Obstructive Pulmonary Disease Mother      Diabetes Mother      Liver Cancer Father      Anesthesia Reaction No family hx of      Clotting Disorder No family hx of        Brother with MI  Social History     Socioeconomic History     Marital status:      Spouse name: Not on file     Number of children: Not on file     Years of education: Not on file     Highest education level: Not on file   Occupational History     Not on file   Tobacco Use     Smoking status: Former     Current packs/day: 0.00     Types: Cigarettes     Quit date: 5/15/1991     Years since quittin.5     Smokeless tobacco: Never   Substance and Sexual Activity     Alcohol use: No     Drug use: No     Sexual activity: Not on file   Other Topics Concern     Not on file   Social History Narrative     Not on file     Social Drivers of Health     Financial Resource Strain: High Risk (2022)    [facility]    Financial Resource Strain      Difficulty of Paying Living Expenses: Not on file      Difficulty of Paying  Living Expenses: Not on file   Food Insecurity: Not on file   Transportation Needs: Not on file   Physical Activity: Not on file   Stress: Not on file   Social Connections: Unknown (1/1/2022)    [facility], [facility]    Social Connections      Frequency of Communication with Friends and Family: Not on file   Interpersonal Safety: Not on file   Housing Stability: Not on file           Medications  Allergies   Current Outpatient Medications   Medication Sig Dispense Refill     acetaminophen (TYLENOL) 500 MG tablet [ACETAMINOPHEN (TYLENOL) 500 MG TABLET] Take 2 tablets (1,000 mg total) by mouth 3 (three) times a day.  0     amLODIPine (NORVASC) 10 MG tablet [AMLODIPINE (NORVASC) 10 MG TABLET] Take 10 mg by mouth daily.       aspirin 81 MG EC tablet Take 81 mg by mouth daily       atorvastatin (LIPITOR) 20 MG tablet [ATORVASTATIN (LIPITOR) 20 MG TABLET] Take 20 mg by mouth bedtime.        Calcium Carb-Cholecalciferol (CALCIUM 600 + D) 600-5 MG-MCG TABS Take 1 tablet by mouth daily.       chlorpheniramine (CHLOR-TRIMETON) 4 mg tablet [CHLORPHENIRAMINE (CHLOR-TRIMETON) 4 MG TABLET] Take 4 mg by mouth every 6 (six) hours as needed for allergies.       levothyroxine (SYNTHROID, LEVOTHROID) 100 MCG tablet Take 112 mcg by mouth daily.  3     lisinopril (PRINIVIL,ZESTRIL) 10 MG tablet [LISINOPRIL (PRINIVIL,ZESTRIL) 10 MG TABLET] Take 10 mg by mouth daily.       naproxen (NAPROSYN) 500 MG tablet Take 500 mg by mouth 2 times daily as needed for moderate pain       timolol maleate (TIMOPTIC) 0.5 % ophthalmic solution [TIMOLOL MALEATE (TIMOPTIC) 0.5 % OPHTHALMIC SOLUTION] Administer 1 drop into the left eye daily.       vit C,I-Ku-ygwch-lutein-zeaxan (PRESERVISION AREDS 2) 903-163-67-1 mg-unit-mg-mg cap [VIT C,P-QC-WNAPF-LUTEIN-ZEAXAN (PRESERVISION AREDS 2) 403-839-13-1 MG-UNIT-MG-MG CAP] Take 1 capsule by mouth 2 (two) times a day.        "nitroglycerin (NITROSTAT) 0.4 MG SL tablet [NITROGLYCERIN (NITROSTAT) 0.4 MG SL TABLET] Place 0.4 mg under the tongue every 5 (five) minutes as needed for chest pain (has never used). (Patient not taking: Reported on 11/26/2024)       omeprazole (PRILOSEC) 40 MG DR capsule Take 1 capsule (40 mg) by mouth 2 times daily (Patient not taking: Reported on 11/26/2024) 60 capsule 3     polyethylene glycol (MIRALAX) 17 gram packet [POLYETHYLENE GLYCOL (MIRALAX) 17 GRAM PACKET] Take 1 packet (17 g total) by mouth daily as needed. (Patient not taking: Reported on 11/26/2024)  0       Allergies   Allergen Reactions     Other Environmental Allergy      Hospital bandaids cause rash and itchng     Pseudoephedrine Palpitations     Rapid heart rate     Typhoid Vaccine [Typhoid Vi Polysaccharide Vaccine] Unknown     rash          Lab Results    Chemistry/lipid CBC Cardiac Enzymes/BNP/TSH/INR   Recent Labs   Lab Test 06/08/23  1318   CHOL 139   HDL 69   LDL 52   TRIG 88     Recent Labs   Lab Test 06/08/23  1318 01/13/22  1352 07/10/20  1217   LDL 52 68 76     Recent Labs   Lab Test 06/20/24  1117      POTASSIUM 4.6   CHLORIDE 101   CO2 25   *   BUN 24.1*   CR 0.86   GFRESTIMATED 68   ROSEMARY 9.5     Recent Labs   Lab Test 06/20/24  1117 06/08/23  1343 04/04/22  1321   CR 0.86 0.75 0.87     No results for input(s): \"A1C\" in the last 17915 hours.       Recent Labs   Lab Test 01/15/21  1227   WBC 7.5   HGB 14.0   HCT 42.9   MCV 92        Recent Labs   Lab Test 01/15/21  1227 09/16/19  1101 09/16/19  0605   HGB 14.0 12.7 15.1    No results for input(s): \"TROPONINI\" in the last 84015 hours.  No results for input(s): \"BNP\", \"NTBNPI\", \"NTBNP\" in the last 83779 hours.  Recent Labs   Lab Test 10/03/24  1103   TSH 0.56     No results for input(s): \"INR\" in the last 01040 hours.     Lyndsay Mae MD  Noninvasive Cardiologist   [facility]                                        Thank you for allowing me to " participate in the care of your patient.      Sincerely,     Lyndsay Mae MD     [facility]  cc:   No referring provider defined for this encounter.

## 2024-11-26 NOTE — PROGRESS NOTES
Radha JJ Tatum  [address] [facility] [address]  [phone] (home)     Procedure cardiologist:  Dr. Gutierrez or Dr. Poon  PCP:  Zee Colon  H&P completed by:  11/26/24 GAIL  Admit date: 12/11/24  Arrival time:  0630  Anticoagulation: Eliquis - Hold 48 hours  CPAP: No  Previous PCI: No  Bypass Grafts: No  Renal Issues: No  Diabetic?: Yes - Prediabetic  Device?: No  Type:  N/A  Ambulation status: Independent, has macular degeneration    Patient Education/  Angiogram Teaching    Reason for Visit:  Patient seen for pre-procedure education in preparation for: CA poss PCI, LHC, and Echo    Procedure Prep:  Primary Cardiologist note dated: 11/26/24  EKG results obtained, dated: to be done on admission  Hemogram results obtained: to be done on admission  Basic Metabolic Panel results obtained: to be done on admission  Lipid Profile results obtained: 6/8/23, to be done on admission  Pertinent cardiac test results: 11/20/24 Abnormal NM stress    Pre-procedure instructions  Patient instructed to be NPO per anesthesia guidelines.  Patient instructed to shower the evening before or the morning of the procedure.  Patient instructed to arrange for transportation home following procedure from a responsible family member of friend (david, Millie). No driving for at least 24 hours.  Patient instructed to have a responsible adult with them for 24 hours post-procedure (Millie hernandez).  Post-procedure follow up process.  Conscious sedation discussed.    Pre-procedure medication instructions  Patient instructed on antiplatelet medication.  Continue medications as scheduled, with a small amount of water on the day of the procedure unless indicated.  Patient instructed to take 4-81 mg of Aspirin AM of procedure: yes  Other medication: Instructed to TAKE Amlodipine, Levothyroxine, Lisinopril, Timolol maleate, Preservision Areds AM of the procedure. Instructed to HOLD all vitamins/supplements AM of the procedure.  Instructed to HOLD Eliquis 48 hours prior to procedure (12/9, 12/10, 12/11 AM)      Patient states understanding of procedure and agrees to proceed.    *PATIENTS RECORDS AVAILABLE IN [facility] UNLESS OTHERWISE INDICATED*      Patient Active Problem List   Diagnosis    Hyperlipidemia    Hypertension    Coronary Artery Disease    Nontoxic Multinodular Goiter    Subclinical Hyperthyroidism    Vitamin D Deficiency    Osteopenia    Postsurgical Hypothyroidism    Osteoarthritis of left knee    HTN (hypertension)    Dyslipidemia    CAD (coronary artery disease)    Rotator cuff arthropathy of right shoulder       Current Outpatient Medications   Medication Sig Dispense Refill    acetaminophen (TYLENOL) 500 MG tablet [ACETAMINOPHEN (TYLENOL) 500 MG TABLET] Take 2 tablets (1,000 mg total) by mouth 3 (three) times a day.  0    amLODIPine (NORVASC) 10 MG tablet [AMLODIPINE (NORVASC) 10 MG TABLET] Take 10 mg by mouth daily.      aspirin 81 MG EC tablet Take 81 mg by mouth daily      atorvastatin (LIPITOR) 20 MG tablet [ATORVASTATIN (LIPITOR) 20 MG TABLET] Take 20 mg by mouth bedtime.       Calcium Carb-Cholecalciferol (CALCIUM 600 + D) 600-5 MG-MCG TABS Take 1 tablet by mouth daily.      chlorpheniramine (CHLOR-TRIMETON) 4 mg tablet [CHLORPHENIRAMINE (CHLOR-TRIMETON) 4 MG TABLET] Take 4 mg by mouth every 6 (six) hours as needed for allergies.      levothyroxine (SYNTHROID, LEVOTHROID) 100 MCG tablet Take 112 mcg by mouth daily.  3    lisinopril (PRINIVIL,ZESTRIL) 10 MG tablet [LISINOPRIL (PRINIVIL,ZESTRIL) 10 MG TABLET] Take 10 mg by mouth daily.      naproxen (NAPROSYN) 500 MG tablet Take 500 mg by mouth 2 times daily as needed for moderate pain      nitroglycerin (NITROSTAT) 0.4 MG SL tablet [NITROGLYCERIN (NITROSTAT) 0.4 MG SL TABLET] Place 0.4 mg under the tongue every 5 (five) minutes as needed for chest pain (has never used). (Patient not taking: Reported on 11/26/2024)      omeprazole (PRILOSEC) 40 MG DR capsule Take 1 capsule  (40 mg) by mouth 2 times daily (Patient not taking: Reported on 11/26/2024) 60 capsule 3    polyethylene glycol (MIRALAX) 17 gram packet [POLYETHYLENE GLYCOL (MIRALAX) 17 GRAM PACKET] Take 1 packet (17 g total) by mouth daily as needed. (Patient not taking: Reported on 11/26/2024)  0    timolol maleate (TIMOPTIC) 0.5 % ophthalmic solution [TIMOLOL MALEATE (TIMOPTIC) 0.5 % OPHTHALMIC SOLUTION] Administer 1 drop into the left eye daily.      vit C,C-Od-umyxo-lutein-zeaxan (PRESERVISION AREDS 2) 590-526-50-1 mg-unit-mg-mg cap [VIT C,A-OD-YRHDQ-LUTEIN-ZEAXAN (PRESERVISION AREDS 2) 416-939-96-1 MG-UNIT-MG-MG CAP] Take 1 capsule by mouth 2 (two) times a day.         Allergies   Allergen Reactions    Other Environmental Allergy      Hospital bandaids cause rash and itchng    Pseudoephedrine Palpitations     Rapid heart rate    Typhoid Vaccine [Typhoid Vi Polysaccharide Vaccine] Unknown     rash         Reanna Hutchins, RN, BSN

## 2024-11-26 NOTE — PATIENT INSTRUCTIONS
I suspect your shortness of breath is from blockages in the vessels to the heart.  This is what the stress test showed.  To verify blockages and to fix them with stents requires an angiogram.  If you do not wish to do the angiogram we try adjusting medications and see if the shortness of breath improves.      You also have an irregular heart beat called atrial fibrillation.  This is very common as we get older, especially if you have high blood pressure.  Atrial fibrillation can drive the heart rate fast and that can cause shortness of breath and a weak heart.  For the atrial fibrillation I would advise a heart monitor to make sure the heart rate is not too fast and an echocardiogram to make sure the heart is not weak.  Additionally atrial fibrillation increases your risk of stroke which is why you need the blood thinner.  If the Eliquis is too expensive there are a few other options including Pradaxa, Xarelto, or warfarin. If you stay on the Eliquis I would increase the dose to 5mg twice daily.

## 2024-12-05 ENCOUNTER — TELEPHONE (OUTPATIENT)
Dept: CARDIOLOGY | Facility: CLINIC | Age: 79
End: 2024-12-05
Payer: MEDICARE

## 2024-12-05 DIAGNOSIS — I20.0 UNSTABLE ANGINA (H): ICD-10-CM

## 2024-12-05 DIAGNOSIS — I48.19 PERSISTENT ATRIAL FIBRILLATION (H): Primary | ICD-10-CM

## 2024-12-05 NOTE — TELEPHONE ENCOUNTER
----- Message -----  From: Lyndsay Mae MD  Sent: 12/5/2024   2:53 PM CST  To: Reanna Hutchins RN    Pradaxa 150mg BID    Lyndsay  ----- Message -----  From: Reanna Hutchins RN  Sent: 12/5/2024   2:08 PM CST  To: Lyndsay Mae MD    Pt called today stating that Pradaxa would be most cost affordable per her insurance. Please provide Rx as appropriate. Pt says her current supply of Eliquis will last her through next week. Thanks! LMS    ==  Will call pt early next week with update and sent in Rx at that time as pt needs to hold anticoagulant for upcoming procedure and is planning to finish current supply of Eliquis. LMS

## 2024-12-05 NOTE — TELEPHONE ENCOUNTER
Received phone call from pt who stated she had several questions.     Pt stated she had discussions recently about which DOAC would be most cost effective with her insurance company. Pt states she has been taking 2.5mg Eliquis BID but her supply is set to run out end of next week. After discussions with her insurance company was told that Pradaxa would be most cost friendly. Offered to send pt copay card in the mail for her to review and see if would prefer that to continue with Eliquis. Pt stated she would like to review this information but more than likely would want to change to Pradaxa. Reassured pt that update would be sent to Dr. Mae so a new Rx can be requested. Informed pt that once response received, will return a call. Understanding verbalized.     Pt also stated she received a Zio monitor in the mail and briefly remembered discussions had about it when she saw Dr. Mae but didn't remember completely.   Reviewed 11/26/24 OV note. Confirmed is a 3 day monitor and explained that the monitor along with an instructional packet are provided in the box. Pt stated she would have her son help apply as with her macular degeneration she has a hard time seeing well. Instructed pt if has any questions while wearing, to call the customer service number provided as they are more familiar with the device. Informed pt that once monitor returned and results obtained would receive a call to review the results. Understanding verbalized.     Questions about upcoming procedure. Pt stated she is still quite frightened by the procedure and still in shock. Provided pt with reassurance and answered all questions related to procedure and what to expect. Pt stated that while still very nervous feels more reassured after time spent talking on the phone. Encouraged pt to reach out if continues to have further questions/concerns related to upcoming procedure. Understanding verbalized. DARNELL

## 2024-12-09 RX ORDER — DABIGATRAN ETEXILATE 150 MG/1
150 CAPSULE ORAL 2 TIMES DAILY
Qty: 180 CAPSULE | Refills: 2 | Status: SHIPPED | OUTPATIENT
Start: 2024-12-09

## 2024-12-09 NOTE — TELEPHONE ENCOUNTER
Received incoming call from pt who is wishing to cancel her procedure scheduled 12/11/24 d/t her son being sick with an upper respiratory infection. Pt states they live together and she is concerned timeline is too close to have him be her 24 hour caregiver after the procedure. Reassured pt that will send message to procedure schedulers to cancel procedure and have them help her reschedule. Pt stated she would like to reschedule in January. Informed pt that she would then need an updated H&P which pt stated she could have completed 12/19 at her PCP. Instructed pt that if develops any new or worsening symptoms to present to the ED. Understanding verbalized.     Pt also inquiring as her current supply of Eliquis will be running out if could receive new Rx of Pradaxa. Reviewed new Rx instructions and confirmed will be sent to pt's preferred pharmacy.     Pt also stated she will be sending back her Zio monitor tomorrow (12/10) and inquiring about costs. Encouraged pt to call her insurance company to review what may or may not be covered. Reassured pt that once results received will be receiving a call. Pt stated she has no further questions/concerns. Understanding verbalized of plan. DARNELL

## 2024-12-09 NOTE — TELEPHONE ENCOUNTER
----- Message -----  From: Melonie Ramirez  Sent: 12/9/2024   9:51 AM CST  To: Keri Louis -    Here is a r/s that can be worked on :)    Melonie  ----- Message -----  From: Reanna Hutchins RN  Sent: 12/9/2024   8:46 AM CST  To: Melonie Lopez,     Pt called today (12/9) and is wishing to cancel procedure 12/11 d/t illness of her caregiver. Pt is wanting to reschedule on a Wednesday after 1/1/2025. Pt is aware she will need an updated H&P which she will be completing at PCP 12/19/24. Labs still on admission.     Case Type: CA poss PCI, C  H&P: 12/19/24 PMD  Alerts: Pre-diabetic, Pradaxa    Pt aware you will be calling to help her reschedule at some point.     ThanksReanna, RN    ==  Noted message from procedure schedulers. Pt rescheduled for 1/8/25. LMS

## 2024-12-12 ENCOUNTER — TELEPHONE (OUTPATIENT)
Dept: CARDIOLOGY | Facility: CLINIC | Age: 79
End: 2024-12-12
Payer: MEDICARE

## 2024-12-12 NOTE — TELEPHONE ENCOUNTER
Received call from pt who had several questions.     Confirmed she is seeing her PCP on 12/19 and will have her pre-op H&P completed at that appt. The PCP is requesting the clinic fax number so the notes from pt's visit can be faxed. Provided pt with clinic fax number.     Pt stated she has picked up her new Pradaxa Rx. Answered questions r/t how to take/store medication and reassured pt this medication is in the same drug class as Eliquis. Pt verbalized understanding and stated she will begin taking Pradaxa tomorrow (12/13) after finishing her Eliqiuis supply today.     Pt stated she sent in her Zio monitor yesterday (12/12) and is concerned that her readings will not be accurate as she doesn't feel her son placed it correctly. Reassured pt that monitor/readers are well equipped to differentiate between real and artifact readings. Informed pt that will likely take a little while to receive the monitor back and have it read/interpreted by Dr. Mae. Reassured pt that once results received will return a call. Understanding verbalized.     Pt also stated she rescheduled her angiogram to 1/8/25. Pt requested updated procedure instructions be sent in the mail so she can review them. Reassured pt that she'd receive a call 1/2 or 1/3 to review procedure instructions and can review procedure education at that time closer to her new date. Pt verbalized understanding.     Pt offered no further questions or concerns. Encouraged pt to reach out if has any further questions/concerns. Pt verbalized understanding and verbalized appreciation of time. LMS

## 2024-12-19 ENCOUNTER — TRANSFERRED RECORDS (OUTPATIENT)
Dept: HEALTH INFORMATION MANAGEMENT | Facility: CLINIC | Age: 79
End: 2024-12-19

## 2025-01-02 ENCOUNTER — PREP FOR PROCEDURE (OUTPATIENT)
Dept: CARDIOLOGY | Facility: CLINIC | Age: 80
End: 2025-01-02
Payer: MEDICARE

## 2025-01-02 ENCOUNTER — TELEPHONE (OUTPATIENT)
Dept: CARDIOLOGY | Facility: CLINIC | Age: 80
End: 2025-01-02
Payer: MEDICARE

## 2025-01-02 DIAGNOSIS — R94.39 ABNORMAL STRESS TEST: ICD-10-CM

## 2025-01-02 DIAGNOSIS — I20.0 UNSTABLE ANGINA (H): ICD-10-CM

## 2025-01-02 DIAGNOSIS — I48.19 PERSISTENT ATRIAL FIBRILLATION (H): ICD-10-CM

## 2025-01-02 NOTE — TELEPHONE ENCOUNTER
----- Message from Keri THOMAS sent at 12/9/2024 10:32 AM CST -----  Case type: CA POSS PCI, [facility]     Procedure Physician(s): DOMINICK     Procedure Date and Patient Arrival Time: Wednesday 1/8/25, with arrival time of 0630    H&P: Pt scheduled on 12/19 WITH PMD    Pre-Procedure Lab Appt: LABS ON ADMISSION- Please place lab orders if you haven't already!    Alerts/Important Info: Pre-diabetic, Pradaxa - [place] [facility] [place]    Missy Shah  ----- Message -----  From: Melonie Ramirez  Sent: 12/9/2024   9:51 AM CST  To: Keri Louis -    Here is a r/s that can be worked on :)    Melonie  ----- Message -----  From: Reanna Hutchins RN  Sent: 12/9/2024   8:46 AM CST  To: Melonie Lopez,     Pt called today (12/9) and is wishing to cancel procedure 12/11 d/t illness of her caregiver. Pt is wanting to reschedule on a Wednesday after 1/1/2025. Pt is aware she will need an updated H&P which she will be completing at PCP 12/19/24. Labs still on admission.     Case Type: CA poss PCI, C  H&P: 12/19/24 PMD  Alerts: Pre-diabetic, Pradaxa    Pt aware you will be calling to help her reschedule at some point.     Reanna Shah RN  ----- Message -----  From: Melonie Ramirez  Sent: 11/26/2024   2:47 PM CST  To: Reanna Hutchins RN    Case type: CA Poss PCI, [facility]  Procedure Physician(s): SAMUEL/CHEL  Procedure Date and Patient Arrival Time: Wednesday 12/11, with arrival time of 0630  H&P: Completed on 11/26 BJW  Pre-Procedure Lab Appt: labs on admission  - Please place lab orders if you haven't already!  Alerts/Important Info: pre-diabetic, newly on Eliquis  Interpretor Requested: n/a    Thank you,  Melonie

## 2025-01-02 NOTE — TELEPHONE ENCOUNTER
Radha Tatum  [address] [facility] [address]  [phone] (home)     Procedure cardiologist:  Dr. Orta  PCP:  Zee Colon  H&P completed by: 12/19/24 Dr. Colon  Admit date: 1/8/25  Arrival time:  0630  Anticoagulation: Yes - Pradaxa  CPAP: No  Previous PCI: No  Bypass Grafts: No  Renal Issues: No  Diabetic?: No  Device?: No  Type:  N/A  Ambulation status: Independent    Patient Education/  Angiogram Teaching    Reason for Visit:  Telephone call to discuss pre-procedure education in preparation for: CA poss PCI, LHC; same day Echo  **Prep for Procedure orders updated, signed and held**    Procedure Prep:  Primary Cardiologist note dated: 11/26/24 BJW  EKG results obtained, dated: To be done on admission  Hemogram results obtained: To be done on admission  Basic Metabolic Panel results obtained: To be done on admission  Lipid Profile results obtained: To be done on admission  Pertinent cardiac test results: 11/20/24 Abnormal NM stress    Pre-procedure instructions  Patient instructed to be NPO per anesthesia guidelines.  Patient instructed to shower the evening before or the morning of the procedure.  Patient instructed to arrange for transportation home following procedure from a responsible family member of friend (daughter, Millie). No driving for at least 24 hours.  Patient instructed to have a responsible adult with them for 24 hours post-procedure (son, Jayesh).  Post-procedure follow up process.  Conscious sedation discussed.    Pre-procedure medication instructions  Patient instructed on antiplatelet medication.  Continue medications as scheduled, with a small amount of water on the day of the procedure unless indicated.  Patient instructed to take 4-81 mg of Aspirin AM of procedure: yes  Other medication: Instructed to TAKE Amlodipine, Synthroid, and Timolol drop AM of the procedure. Instructed to HOLD Lisinopril and all vitamins/supplements AM of the procedure. Instructed to HOLD Pradaxa  48 hours prior to procedure (1/6, 1/7, 1/8).       Patient states understanding of procedure and agrees to proceed.    *PATIENTS RECORDS AVAILABLE IN [facility] UNLESS OTHERWISE INDICATED*      Patient Active Problem List   Diagnosis    Hyperlipidemia    Hypertension    Coronary Artery Disease    Nontoxic Multinodular Goiter    Subclinical Hyperthyroidism    Vitamin D Deficiency    Osteopenia    Postsurgical Hypothyroidism    Osteoarthritis of left knee    HTN (hypertension)    Dyslipidemia    CAD (coronary artery disease)    Rotator cuff arthropathy of right shoulder       Current Outpatient Medications   Medication Sig Dispense Refill    acetaminophen (TYLENOL) 500 MG tablet [ACETAMINOPHEN (TYLENOL) 500 MG TABLET] Take 2 tablets (1,000 mg total) by mouth 3 (three) times a day.  0    amLODIPine (NORVASC) 10 MG tablet [AMLODIPINE (NORVASC) 10 MG TABLET] Take 10 mg by mouth daily.      aspirin 81 MG EC tablet Take 81 mg by mouth daily      atorvastatin (LIPITOR) 20 MG tablet [ATORVASTATIN (LIPITOR) 20 MG TABLET] Take 20 mg by mouth bedtime.       Calcium Carb-Cholecalciferol (CALCIUM 600 + D) 600-5 MG-MCG TABS Take 1 tablet by mouth daily.      chlorpheniramine (CHLOR-TRIMETON) 4 mg tablet [CHLORPHENIRAMINE (CHLOR-TRIMETON) 4 MG TABLET] Take 4 mg by mouth every 6 (six) hours as needed for allergies.      dabigatran ANTICOAGULANT (PRADAXA) 150 MG capsule Take 1 capsule (150 mg) by mouth 2 times daily. Store in original 's bottle or blister pack; use within 120 days of opening. 180 capsule 2    levothyroxine (SYNTHROID, LEVOTHROID) 100 MCG tablet Take 112 mcg by mouth daily.  3    lisinopril (PRINIVIL,ZESTRIL) 10 MG tablet [LISINOPRIL (PRINIVIL,ZESTRIL) 10 MG TABLET] Take 10 mg by mouth daily.      naproxen (NAPROSYN) 500 MG tablet Take 500 mg by mouth 2 times daily as needed for moderate pain      nitroglycerin (NITROSTAT) 0.4 MG SL tablet [NITROGLYCERIN (NITROSTAT) 0.4 MG SL TABLET] Place 0.4 mg under the  tongue every 5 (five) minutes as needed for chest pain (has never used). (Patient not taking: Reported on 11/26/2024)      omeprazole (PRILOSEC) 40 MG DR capsule Take 1 capsule (40 mg) by mouth 2 times daily (Patient not taking: Reported on 11/26/2024) 60 capsule 3    polyethylene glycol (MIRALAX) 17 gram packet [POLYETHYLENE GLYCOL (MIRALAX) 17 GRAM PACKET] Take 1 packet (17 g total) by mouth daily as needed. (Patient not taking: Reported on 11/26/2024)  0    timolol maleate (TIMOPTIC) 0.5 % ophthalmic solution [TIMOLOL MALEATE (TIMOPTIC) 0.5 % OPHTHALMIC SOLUTION] Administer 1 drop into the left eye daily.      vit C,Z-Ts-ejswp-lutein-zeaxan (PRESERVISION AREDS 2) 053-827-62-1 mg-unit-mg-mg cap [VIT C,S-EU-ATFHE-LUTEIN-ZEAXAN (PRESERVISION AREDS 2) 944-750-08-1 MG-UNIT-MG-MG CAP] Take 1 capsule by mouth 2 (two) times a day.         Allergies   Allergen Reactions    Other Environmental Allergy      Hospital bandaids cause rash and itchng    Pseudoephedrine Palpitations     Rapid heart rate    Typhoid Vaccine [Typhoid Vi Polysaccharide Vaccine] Unknown     rash         Reanna Hutchisn, RN, BSN

## 2025-01-08 ENCOUNTER — HOSPITAL ENCOUNTER (OUTPATIENT)
Facility: HOSPITAL | Age: 80
Discharge: HOME | End: 2025-01-08
Attending: INTERNAL MEDICINE | Admitting: INTERNAL MEDICINE
Payer: MEDICARE

## 2025-01-08 ENCOUNTER — HOSPITAL ENCOUNTER (OUTPATIENT)
Dept: CARDIOLOGY | Facility: HOSPITAL | Age: 80
Discharge: HOME | End: 2025-01-08
Attending: INTERNAL MEDICINE | Admitting: INTERNAL MEDICINE
Payer: MEDICARE

## 2025-01-08 VITALS
HEIGHT: 61 IN | DIASTOLIC BLOOD PRESSURE: 53 MMHG | SYSTOLIC BLOOD PRESSURE: 111 MMHG | TEMPERATURE: 98 F | BODY MASS INDEX: 31.91 KG/M2 | OXYGEN SATURATION: 95 % | HEART RATE: 74 BPM | WEIGHT: 169 LBS | RESPIRATION RATE: 17 BRPM

## 2025-01-08 DIAGNOSIS — R94.39 ABNORMAL STRESS TEST: ICD-10-CM

## 2025-01-08 DIAGNOSIS — I48.19 PERSISTENT ATRIAL FIBRILLATION (H): ICD-10-CM

## 2025-01-08 DIAGNOSIS — I20.0 UNSTABLE ANGINA (H): ICD-10-CM

## 2025-01-08 PROBLEM — Z98.890 STATUS POST CORONARY ANGIOGRAM: Status: ACTIVE | Noted: 2025-01-08

## 2025-01-08 LAB
ABO + RH BLD: NORMAL
ANION GAP SERPL CALCULATED.3IONS-SCNC: 11 MMOL/L (ref 7–15)
ATRIAL RATE - MUSE: 70 BPM
BLD GP AB SCN SERPL QL: NEGATIVE
BUN SERPL-MCNC: 25.3 MG/DL (ref 8–23)
CALCIUM SERPL-MCNC: 9.4 MG/DL (ref 8.8–10.4)
CHLORIDE SERPL-SCNC: 103 MMOL/L (ref 98–107)
CHOLEST SERPL-MCNC: 131 MG/DL
CREAT SERPL-MCNC: 0.89 MG/DL (ref 0.51–0.95)
DIASTOLIC BLOOD PRESSURE - MUSE: NORMAL MMHG
EGFRCR SERPLBLD CKD-EPI 2021: 65 ML/MIN/1.73M2
ERYTHROCYTE [DISTWIDTH] IN BLOOD BY AUTOMATED COUNT: 13.9 % (ref 10–15)
FASTING STATUS PATIENT QL REPORTED: YES
FASTING STATUS PATIENT QL REPORTED: YES
GLUCOSE SERPL-MCNC: 124 MG/DL (ref 70–99)
HCO3 SERPL-SCNC: 25 MMOL/L (ref 22–29)
HCT VFR BLD AUTO: 44.5 % (ref 35–47)
HDLC SERPL-MCNC: 60 MG/DL
HGB BLD-MCNC: 14.4 G/DL (ref 11.7–15.7)
INTERPRETATION ECG - MUSE: NORMAL
LDLC SERPL CALC-MCNC: 59 MG/DL
LVEF ECHO: NORMAL
MCH RBC QN AUTO: 29.3 PG (ref 26.5–33)
MCHC RBC AUTO-ENTMCNC: 32.4 G/DL (ref 31.5–36.5)
MCV RBC AUTO: 90 FL (ref 78–100)
NONHDLC SERPL-MCNC: 71 MG/DL
P AXIS - MUSE: NORMAL DEGREES
PLATELET # BLD AUTO: 205 10E3/UL (ref 150–450)
POTASSIUM SERPL-SCNC: 4.4 MMOL/L (ref 3.4–5.3)
PR INTERVAL - MUSE: NORMAL MS
QRS DURATION - MUSE: 98 MS
QT - MUSE: 378 MS
QTC - MUSE: 433 MS
R AXIS - MUSE: 70 DEGREES
RBC # BLD AUTO: 4.92 10E6/UL (ref 3.8–5.2)
SODIUM SERPL-SCNC: 139 MMOL/L (ref 135–145)
SPECIMEN EXP DATE BLD: NORMAL
SYSTOLIC BLOOD PRESSURE - MUSE: NORMAL MMHG
T AXIS - MUSE: 12 DEGREES
TRIGL SERPL-MCNC: 61 MG/DL
VENTRICULAR RATE- MUSE: 79 BPM
WBC # BLD AUTO: 5.9 10E3/UL (ref 4–11)

## 2025-01-08 PROCEDURE — 82310 ASSAY OF CALCIUM: CPT | Performed by: INTERNAL MEDICINE

## 2025-01-08 PROCEDURE — 255N000002 HC RX 255 OP 636: Performed by: INTERNAL MEDICINE

## 2025-01-08 PROCEDURE — 93306 TTE W/DOPPLER COMPLETE: CPT | Mod: 26 | Performed by: INTERNAL MEDICINE

## 2025-01-08 PROCEDURE — C1894 INTRO/SHEATH, NON-LASER: HCPCS | Performed by: INTERNAL MEDICINE

## 2025-01-08 PROCEDURE — 36415 COLL VENOUS BLD VENIPUNCTURE: CPT | Performed by: INTERNAL MEDICINE

## 2025-01-08 PROCEDURE — 250N000009 HC RX 250: Performed by: INTERNAL MEDICINE

## 2025-01-08 PROCEDURE — 80061 LIPID PANEL: CPT | Performed by: INTERNAL MEDICINE

## 2025-01-08 PROCEDURE — C1887 CATHETER, GUIDING: HCPCS | Performed by: INTERNAL MEDICINE

## 2025-01-08 PROCEDURE — 250N000011 HC RX IP 250 OP 636: Performed by: INTERNAL MEDICINE

## 2025-01-08 PROCEDURE — 93005 ELECTROCARDIOGRAM TRACING: CPT

## 2025-01-08 PROCEDURE — 80048 BASIC METABOLIC PNL TOTAL CA: CPT | Performed by: INTERNAL MEDICINE

## 2025-01-08 PROCEDURE — 86850 RBC ANTIBODY SCREEN: CPT | Performed by: INTERNAL MEDICINE

## 2025-01-08 PROCEDURE — 85041 AUTOMATED RBC COUNT: CPT | Performed by: INTERNAL MEDICINE

## 2025-01-08 PROCEDURE — 93306 TTE W/DOPPLER COMPLETE: CPT

## 2025-01-08 PROCEDURE — 99152 MOD SED SAME PHYS/QHP 5/>YRS: CPT | Performed by: INTERNAL MEDICINE

## 2025-01-08 PROCEDURE — 93458 L HRT ARTERY/VENTRICLE ANGIO: CPT | Performed by: INTERNAL MEDICINE

## 2025-01-08 PROCEDURE — 85014 HEMATOCRIT: CPT | Performed by: INTERNAL MEDICINE

## 2025-01-08 PROCEDURE — 272N000001 HC OR GENERAL SUPPLY STERILE: Performed by: INTERNAL MEDICINE

## 2025-01-08 PROCEDURE — 258N000003 HC RX IP 258 OP 636: Performed by: INTERNAL MEDICINE

## 2025-01-08 PROCEDURE — 86900 BLOOD TYPING SEROLOGIC ABO: CPT | Performed by: INTERNAL MEDICINE

## 2025-01-08 PROCEDURE — 93458 L HRT ARTERY/VENTRICLE ANGIO: CPT | Mod: 26 | Performed by: INTERNAL MEDICINE

## 2025-01-08 PROCEDURE — 999N000054 HC STATISTIC EKG NON-CHARGEABLE

## 2025-01-08 RX ORDER — LIDOCAINE 40 MG/G
CREAM TOPICAL
Status: DISCONTINUED | OUTPATIENT
Start: 2025-01-08 | End: 2025-01-08 | Stop reason: HOSPADM

## 2025-01-08 RX ORDER — ACETAMINOPHEN 325 MG/1
650 TABLET ORAL EVERY 4 HOURS PRN
Status: DISCONTINUED | OUTPATIENT
Start: 2025-01-08 | End: 2025-01-08 | Stop reason: HOSPADM

## 2025-01-08 RX ORDER — NALOXONE HYDROCHLORIDE 0.4 MG/ML
0.4 INJECTION, SOLUTION INTRAMUSCULAR; INTRAVENOUS; SUBCUTANEOUS
Status: DISCONTINUED | OUTPATIENT
Start: 2025-01-08 | End: 2025-01-08 | Stop reason: HOSPADM

## 2025-01-08 RX ORDER — OXYCODONE HYDROCHLORIDE 5 MG/1
5 TABLET ORAL EVERY 4 HOURS PRN
Status: DISCONTINUED | OUTPATIENT
Start: 2025-01-08 | End: 2025-01-08 | Stop reason: HOSPADM

## 2025-01-08 RX ORDER — OXYCODONE HYDROCHLORIDE 5 MG/1
10 TABLET ORAL EVERY 4 HOURS PRN
Status: DISCONTINUED | OUTPATIENT
Start: 2025-01-08 | End: 2025-01-08 | Stop reason: HOSPADM

## 2025-01-08 RX ORDER — ASPIRIN 81 MG/1
243 TABLET, CHEWABLE ORAL ONCE
Status: COMPLETED | OUTPATIENT
Start: 2025-01-08 | End: 2025-01-08

## 2025-01-08 RX ORDER — NALOXONE HYDROCHLORIDE 0.4 MG/ML
0.2 INJECTION, SOLUTION INTRAMUSCULAR; INTRAVENOUS; SUBCUTANEOUS
Status: DISCONTINUED | OUTPATIENT
Start: 2025-01-08 | End: 2025-01-08 | Stop reason: HOSPADM

## 2025-01-08 RX ORDER — ASPIRIN 325 MG
325 TABLET ORAL ONCE
Status: COMPLETED | OUTPATIENT
Start: 2025-01-08 | End: 2025-01-08

## 2025-01-08 RX ORDER — FENTANYL CITRATE 50 UG/ML
INJECTION, SOLUTION INTRAMUSCULAR; INTRAVENOUS
Status: DISCONTINUED | OUTPATIENT
Start: 2025-01-08 | End: 2025-01-08 | Stop reason: HOSPADM

## 2025-01-08 RX ORDER — FENTANYL CITRATE 50 UG/ML
25 INJECTION, SOLUTION INTRAMUSCULAR; INTRAVENOUS
Status: DISCONTINUED | OUTPATIENT
Start: 2025-01-08 | End: 2025-01-08 | Stop reason: HOSPADM

## 2025-01-08 RX ORDER — SODIUM CHLORIDE 9 MG/ML
INJECTION, SOLUTION INTRAVENOUS CONTINUOUS
Status: DISCONTINUED | OUTPATIENT
Start: 2025-01-08 | End: 2025-01-08 | Stop reason: HOSPADM

## 2025-01-08 RX ORDER — FLUMAZENIL 0.1 MG/ML
0.2 INJECTION, SOLUTION INTRAVENOUS
Status: DISCONTINUED | OUTPATIENT
Start: 2025-01-08 | End: 2025-01-08 | Stop reason: HOSPADM

## 2025-01-08 RX ORDER — ONDANSETRON 2 MG/ML
INJECTION INTRAMUSCULAR; INTRAVENOUS
Status: DISCONTINUED | OUTPATIENT
Start: 2025-01-08 | End: 2025-01-08 | Stop reason: HOSPADM

## 2025-01-08 RX ORDER — ATROPINE SULFATE 0.1 MG/ML
0.5 INJECTION INTRAVENOUS
Status: DISCONTINUED | OUTPATIENT
Start: 2025-01-08 | End: 2025-01-08 | Stop reason: HOSPADM

## 2025-01-08 RX ORDER — IODIXANOL 320 MG/ML
INJECTION, SOLUTION INTRAVASCULAR
Status: DISCONTINUED | OUTPATIENT
Start: 2025-01-08 | End: 2025-01-08 | Stop reason: HOSPADM

## 2025-01-08 RX ADMIN — SODIUM CHLORIDE: 9 INJECTION, SOLUTION INTRAVENOUS at 07:05

## 2025-01-08 ASSESSMENT — ACTIVITIES OF DAILY LIVING (ADL)
ADLS_ACUITY_SCORE: 42
ADLS_ACUITY_SCORE: 41
ADLS_ACUITY_SCORE: 42

## 2025-01-08 ASSESSMENT — EJECTION FRACTION: EF_VALUE: .2

## 2025-01-08 NOTE — PLAN OF CARE
Pt alert and oriented. Afib on monitor. Vss on room air. Complains of baker otherwise denies sob at rest. Pt awaiting to have right knee procedure done once heart is cleared. Daughter in room with patient visiting and can be updated regarding plan of care. Pt prepped and ready for cardiac procedure.

## 2025-01-08 NOTE — PROGRESS NOTES
Discharge home. No changes to medications.  Follow up scheduled with Dr Mae.  Wrist stable.  Lightheaded post procedure.  Blood pressure stable.  Nausea resolved.  Walked in the halls twice with stand by assist.  Feels comfortable going home.

## 2025-01-08 NOTE — PRE-PROCEDURE
GENERAL PRE-PROCEDURE:   Procedure:  Coronary angiogram with possible PCI, left heart catheterization  Date/Time:  1/8/2025 6:57 AM    Written consent obtained?: Yes    Risks and benefits: Risks, benefits and alternatives were discussed    Consent given by:  Patient  Patient states understanding of procedure being performed: Yes    Patient's understanding of procedure matches consent: Yes    Procedure consent matches procedure scheduled: Yes    Expected level of sedation:  Moderate  Appropriately NPO:  Yes  ASA Class:  3 (GAVIRIA/UA, abnormal stress test, HTN, HLD, perAF, Class I obesity; BMI 31.93kg/m2)  Mallampati  :  Grade 2- soft palate, base of uvula, tonsillar pillars, and portion of posterior pharyngeal wall visible  Lungs:  Lungs clear with good breath sounds bilaterally  Heart:  Normal heart sounds and rate  History & Physical reviewed:  History and physical reviewed and updates made (see comment)  H&P Comments:  Clinically Significant Risk Factors Present on Admission    Cardiovascular : GAVIRIA/UA, abnormal stress test, HTN, HLD, perAF, Class I obesity; BMI 31.93kg/m2    Fluid & Electrolyte Disorders : Not present on admission    Gastroenterology : Not present on admission    Hematology/Oncology : Not present on admission    Nephrology : Not present on admission    Neurology : Not present on admission    Pulmonology : Not present on admission    Systemic : Not present on admission  Statement of review:  I have reviewed the lab findings, diagnostic data, medications, and the plan for sedation

## 2025-01-08 NOTE — H&P
H/P was performed at [facility] by Dr. Colon on 12/19/2024 and is available for review in EPIC under Care Everywhere, as well as copied and pasted below for convenience        [facility]  Outside Information  Documentation Only  1/8/2025  Radha Tatum - 79 y.o. Female; born [birth year]Progress note - 12/19/2024, generated on Jan. 08, 2025January 08, 2025   Allergies  Reconcile with Patient's ChartAllergies  Allergen (clinical drug ingredient) Drug/Non Drug Allergy documented on EMR Reaction Allergy Type Onset Date Status     PARATYPHOID (uncoded) 1994 Allergy   Active     Sudafed tachycardia Drug Allergy   Active     Typhoid Vaccine 1994 Drug Allergy   Active     Results    Results  Component Value Reference Range Notes   CELLDYN-HGB  Reviewed date:12/19/2024 11:45:18 AM  Interpretation:14.4  Performing Lab:  Notes/Report:   HGB 14.4 11.7 - 15.7 g/dL       REASON FOR VISIT    Pre-Op Physical DOS 1-8-2025 [address] unknown, Angiogram, fax number unknown, Fax #: [phone]  Medications  Reconcile with Patient's ChartMedications  Medication SIG (Take, Route, Frequency, Duration) Notes Start Date End Date Status   Vitamin D3 50 MCG (2000 UT) 2 cap(s) orally once a day       Active   Timolol Maleate 0.5 % 1 gtt left eye once a day       Active   Levothyroxine Sodium 112 MCG 1 tablet in the morning on an empty stomach Orally Once a day for 90 days   07/29/2024   Active   ICaps AREDS Formula - 1 tab(s) orally twice a day for 30 day(s)       Active   Prolia         Active   Lisinopril 10 MG TAKE 1 TABLET BY MOUTH EVERY DAY for 90       Active   Atorvastatin Calcium 20 MG TAKE 1 TABLET BY MOUTH AT BEDTIME for 90 day(s)       Active   Aspirin 81 MG 1 tab(s) orally once a day       Active   Chlorpheniramine Maleate 4 MG 1 tab(s) orally 4 times a day as needed       Active   Calcium 600 + D 600-5 MG-MCG 1 tablet with a meal Orally Once a day       Active   Dabigatran Etexilate Mesylate 150 MG  1 capsule Orally Twice a day (Pradaxa)       Active   amLODIPine Besylate 10 MG 1 tablet Orally Once a day for 90 days       Active   Accu-Chek Softclix Lancets - as directed for 90 days   06/12/2023   Active     Social History    Tobacco Use:  Social History  Social History Observation Description Date   Details (start date - stop date) Never Smoker NA - NA     Alcohol Screen  Social History  Question Answer Notes   Did you have a drink containing alcohol in the past year? No     Points 0     Interpretation Negative       Tobacco Status  Social History  Question Answer Notes   I am: never smoker       Problems  Reconcile with Patient's ChartProblems  Problem Type SNOMED Code ICD Code Onset Dates Problem Status W/U Status Risk Notes   Problem [id number] Atherosclerosis of native coronary artery of transplanted heart with unstable angina (I25.750)   Active confirmed         Vital Signs    Vital Signs  Blood pressure systolic 120 mm Hg 12/19/2024   Blood pressure diastolic 60 mm Hg 12/19/2024   Oximetry 94 12/19/2024     Encounters    Encounters  Encounter Location Date Provider Diagnosis   [facility] [address] [place], [place] [id number] 12/19/2024 Zee Colon Encounter for other preprocedural examination Z01.818 ; Postoperative hypothyroidism E89.0 ; Type 2 diabetes mellitus without complication, without long-term current use of insulin E11.9 ; Hyperlipidemia E78.5 and Atherosclerosis of native coronary artery of transplanted heart with unstable angina I25.750     Assessments    Assessments  Encounter Date Diagnosis (ICD Code) Assessment Notes Treatment Notes Treatment Clinical Notes Section Notes   12/19/2024 Encounter for other preprocedural examination (ICD-10 - Z01.818)           12/19/2024 Postoperative hypothyroidism (ICD-10 - E89.0)           12/19/2024 Type 2 diabetes mellitus without complication, without long-term current use of insulin (ICD-10 - E11.9)           12/19/2024 Hyperlipidemia  (ICD-10 - E78.5)           12/19/2024 Atherosclerosis of native coronary artery of transplanted heart with unstable angina (ICD-10 - I25.750)           12/19/2024 Other   Assessment and Plan:   1. clearance for angiogram: - Patient saw a cardiologist and is scheduled for an angiogram. - Advised to hold lisinopril on the morning of the procedure. - Patient to follow cardiologist nurse's instructions for other medications.   2. Anticoagulation management: - Switched from Eliquis to dabigatran due to cost considerations. - Dose adjusted in response to chronic kidney disease. - Patient advised to consult cardiologist regarding management of blood thinner around the time of surgery.   5. Pre-operative instructions: - NPO status post-midnight, with an exception for a small sip of water for medication purposes. - Patient informed of the potential for an overnight stay based on post-anesthesia recovery.   6. Echocardiogram: - Scheduled on the same day prior to undergoing the angiogram.   7. Right wrist pain: - Condition possibly attributed to a sprain or strain. - Recommendation for caution and to monitor for any exacerbation of symptoms.          Plan Of Treatment    Treatment Notes  Plan Of Treatment  Assessment Notes   Other Assessment and Plan:   1. clearance for angiogram: - Patient saw a cardiologist and is scheduled for an angiogram. - Advised to hold lisinopril on the morning of the procedure. - Patient to follow cardiologist nurse's instructions for other medications.   2. Anticoagulation management: - Switched from Eliquis to dabigatran due to cost considerations. - Dose adjusted in response to chronic kidney disease. - Patient advised to consult cardiologist regarding management of blood thinner around the time of surgery.   5. Pre-operative instructions: - NPO status post-midnight, with an exception for a small sip of water for medication purposes. - Patient informed of the potential for an overnight stay based  on post-anesthesia recovery.   6. Echocardiogram: - Scheduled on the same day prior to undergoing the angiogram.   7. Right wrist pain: - Condition possibly attributed to a sprain or strain. - Recommendation for caution and to monitor for any exacerbation of symptoms.      Next Appt  Plan Of Treatment  Details   Provider Name:Zee Colon, 2025 11:00:00 AM, [address], [place], [place], [id number], [phone]     Progress Notes    Progress Notes -  UCHERadha MORENO : 1945 (78 yo F) Acc No. [id number] DOS: 2024   -      Patient: Radha WASHINGTON  Account Number: [id number] Provider: Zee Colon MD        : 1945   Age: 79 Y   Sex: Female Date: 2024   Phone: [phone]    Address: [address] [place] [address]       Subjective:   -  Chief Complaints:     -     Pre-Op Physical DOS 2025 [place] Dr unknown, Angiogram, fax number unknownFax #: [phone]     -  HPI:     Preoperative History and Physical:         Location of Injury / Illness: see below.         Type of Surgery: Angiogram.         Date of Surgery 2025.         Requesting Surgeon: Dr. monsalve.         Surgical Facility: [facility].         Underlying Health Problems: .see below.         Blood Transfusion Status there is no transfusion refusal.         Recovery Plans: At home, with family.      Diabetes:          She has well controlled diabetes, diet controlled.  She is on a statin, last eye exam 22, on asa. She is on an ace inhibitor.     Hypertension:          She is on lisinopril 10 mg and amldoipine 10 mg daily. No issues with medication.     Hypothyroid:          Taking levothyroxine 100 mcg daily.     Osteoporosis:          History of osteoporosis and now on prolia. Gets through endocrine. Taking calcium and vitamin d supplements.  plan after her follow up and labs earlier this month is to stop prolia and start reclast. those notes were reviewed once we were able to get them.      Hyperlipidemia:          on atorvastatin 20 mg. last lipids 1/2022.     Cardiovascular:          History of CAD, in past mild and medically managed.  She had an abnormal stress test in the last few months and seen by cardiology with plans for angiogram.  Here today for preoperative clearance for this.  she has a.fib which is a new diagnosis for her as well.  was started on elliquis, but is not taking it due to cost.  on dabigatran/pradaxa now which is more affordable.     General:          The patient, Radha Tatum, presented for clearance after undergoing an angiogram and discussing her medication regimen. She reported that her previous prescription for Eliquis was too expensive, leading to a switch to dabigatran. It was also given at the lower dose due to her age over 78 by me as at the time I felt her weight was low. However, the cardiologist pointed out that it was not the right dose and switched her. Radha also reported receiving a heart monitor unexpectedly, using it for three days, and is currently awaiting the results.     aRdha expressed concerns about the angiogram procedure and its potential outcomes, including the possibility of needing a stent or open-heart bypass. She acknowledged the importance of being cautious and following the recommended treatment plan.     The patient also mentioned her right wrist has been hurting slightly, suspecting a minor sprain, and her knees are still causing her discomfort, but she is not interested in another replacement.     -  ROS:      CONSTITUTIONAL:         Negative for fever.        OPHTHALMOLOGY:         Negative for eye pain, mattering.        ENT:         Negative for cold, cough, URI symptoms.        RESPIRATORY:         Negative for chest congestion, cough, wheezing.        CARDIOVASCULAR:         Negative for chest pain, palpitations.        GASTROENTEROLOGY:         Negative for vomiting, diarrhea.        GYNECOLOGY:         Negative for current pregnancy.         UROLOGY:         Negative for dysuria.        MUSCULOSKELETAL:         Negative for leg cramps, sciatica.        NEUROLOGY:         Negative for seizures.        DERMATOLOGY:         Negative for rash, hives.        HEMATOLOGY/ONCOLOGY:         Negative for easy bruising, swollen glands.             -  Medical History:      -  Gyn History:      Menarche (age of onset) 10.      Age at menopause  59.      Last pap smear date 07--prior to hysterectomy.      Abnormal Pap smear ? years ago had colposcopy.      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      Pregnancies 2.      Total living children 2.      -  Surgical History: No personal or family history of anesthesia problems No personal or family history of bleeding problems hysterectomy, took ovaries and tube appendectomy at age 3 carpal tunnel, bilateral couple years apart rectocele repair left cataract surgery Right knee arthroscopy 2013Left knee arthroscopy with partial medial meniscectomy 2016left total knee arthroplasty 2017     -  Hospitalization/Major Diagnostic Procedure: Denies Past Hospitalization     -  Family History: Father:  65 yrs, Liver CA, diagnosed with Cancer. Mother:  84 yrs, Emphysema, diabetes, type 2, diagnosed with Diabetes. Siblings: Brother with MS. Brother  at 50 of Carbon Dioxide.. Children: alive, 2 over wt. Maternal Grand Mother: , DM, HTN,. 3 brother(s) , 1 sister(s) . 1 son(s) , 1 daughter(s) . .      -  Social History:      Living Situation         Residence: In own home        Coinhabitants:  Son     Tobacco Status         I am: never smoker     Marital Status: .     Secondhand smoke exposure: no, none.     Level of education: High School, Technical school.     Recreational drug use: no.     Exercise: no.     Pets: cat(s).     Sexually active: Not sexually active.     Seat belt use: Yes.     Do you feel safe: yes, at home.      Caffeine: decaff.     Amish: Quaker.     Guns in home: No.     Smoke detector use: Yes.     Financial Concerns: no.     Alcohol Screen         Did you have a drink containing alcohol in the past year? No        Points 0        Interpretation Negative     Alcohol: rare.     Occupation: retired from post-office.     Primary language spoken: English.     Ethnicity: .     Country of Origin(Birth): [place].     Race: /White.     Living Situation: pt's son lives with her..     -  Medications: TakingAccu-Chek Softclix Lancets - Miscellaneous as directed amLODIPine Besylate 10 MG Tablet 1 tablet Orally Once a day Aspirin 81 MG Tablet Delayed Release 1 tab(s) orally once a day Atorvastatin Calcium 20 MG Tablet TAKE 1 TABLET BY MOUTH AT BEDTIME Calcium 600 + D 600-5 MG-MCG Tablet 1 tablet with a meal Orally Once a day Chlorpheniramine Maleate 4 MG Tablet 1 tab(s) orally 4 times a day as needed Dabigatran Etexilate Mesylate 150 MG Capsule 1 capsule Orally Twice a day (Pradaxa) ICaps AREDS Formula - Tablet 1 tab(s) orally twice a day Levothyroxine Sodium 112 MCG Tablet 1 tablet in the morning on an empty stomach Orally Once a day Lisinopril 10 MG Tablet TAKE 1 TABLET BY MOUTH EVERY DAY Prolia Timolol Maleate 0.5 % Solution 1 gtt left eye once a day Vitamin D3 50 MCG (2000 UT) Capsule 2 cap(s) orally once a day Taking Accu-Chek Softclix Lancets - Miscellaneous as directed Taking amLODIPine Besylate 10 MG Tablet 1 tablet Orally Once a day Taking Aspirin 81 MG Tablet Delayed Release 1 tab(s) orally once a day Taking Atorvastatin Calcium 20 MG Tablet TAKE 1 TABLET BY MOUTH AT BEDTIME Taking Calcium 600 + D 600-5 MG-MCG Tablet 1 tablet with a meal Orally Once a day Taking Chlorpheniramine Maleate 4 MG Tablet 1 tab(s) orally 4 times a day as needed Taking Dabigatran Etexilate Mesylate 150 MG Capsule 1 capsule Orally Twice a day (Pradaxa) Taking ICaps AREDS Formula - Tablet 1 tab(s) orally twice a day  Taking Levothyroxine Sodium 112 MCG Tablet 1 tablet in the morning on an empty stomach Orally Once a day Taking Lisinopril 10 MG Tablet TAKE 1 TABLET BY MOUTH EVERY DAY Taking Prolia Taking Timolol Maleate 0.5 % Solution 1 gtt left eye once a day Taking Vitamin D3 50 MCG (2000 UT) Capsule 2 cap(s) orally once a day DiscontinuedDiabetic Glucometer test daily and as needed for type 2 diabetes Diabetic Lancets . . test fasting blood sugar and as needed . . Diabetic Test Strips - Strips test fasting blood sugar daily and as needed Eliquis 2.5 MG Tablet 1 tablet Orally twice a day Medication List reviewed and reconciled with the patientDiscontinued Diabetic Glucometer test daily and as needed for type 2 diabetes Discontinued Diabetic Lancets . . test fasting blood sugar and as needed . . Discontinued Diabetic Test Strips - Strips test fasting blood sugar daily and as needed Discontinued Eliquis 2.5 MG Tablet 1 tablet Orally twice a day Medication List reviewed and reconciled with the patient     -  Allergies: Typhoid Vaccine: 1994PARATYPHOID: 1994Sudafed: tachycardiano[Allergies Verified]       Objective:   -  Vitals: Pulse: 77, BP: 120/60, Arm / Cuff size: rt/large, Pulse Oximetry: 94, Initials: kl.     -  Physical Examination:      GENERAL:         General Appearance: no acute distress.      HEENT:         Sclera: anicteric.         Pupils: ERRLA.         Extraocular motion intact.         Oral cavity: normal, no lesions seen.         Nose: normal, no lesions or rhinorrhea.         Pharynx: no erythema or exudate .         Tympanic membrane: normal with no perforations, retractions or bulging.      NECK:         Thyroid: not enlarged.         Cervical lymph nodes: normal.         Carotid bruit not detected.      LUNGS:         Breath sounds: bilaterally clear to auscultation.      HEART:         Rate and Rhythm: rate normal, rhythm regular, S1 & S2 nl, no murmur, S3 or S4.      ABDOMEN:         Bowel sounds normal.          No tenderness, organomegaly or masses.      EXTREMITIES:         Pulses: 2+ bilaterally with no ankle edema.         Are intact.      MUSCULOSKELETAL:         Lower extremity joints: unremarkable.      NEUROLOGICAL:         Coordination: normal.         Reflexes: 2+ bilaterally.      DERMATOLOGY:         Skin: no rashes or other lesions.             Assessment:   -  Assessment:       1. Encounter for other preprocedural examination - Z01.818 (Primary)   2. Postoperative hypothyroidism - E89.0   3. Type 2 diabetes mellitus without complication, without long-term current use of insulin - E11.9   4. Hyperlipidemia - E78.5   5. Atherosclerosis of native coronary artery of transplanted heart with unstable angina - I25.750          Plan:   -  Treatment:   -  1. Encounter for other preprocedural examination        LAB: CELLDYN-HGB (Collection Date & Time - 12/19/2024 10:30 AM) 14.4  -                           Value Reference Range        HGB 14.4   11.7 - 15.7 - g/dL     -  Adrian Stahl 12/19/2024 10:30:14 AM > Transmitted to Zee Buckner MD 12/19/2024 11:45:16 AM >          2. Others    Notes: Assessment and Plan:  1. clearance for angiogram: - Patient saw a cardiologist and is scheduled for an angiogram. - Advised to hold lisinopril on the morning of the procedure. - Patient to follow cardiologist nurse's instructions for other medications.  2. Anticoagulation management: - Switched from Eliquis to dabigatran due to cost considerations. - Dose adjusted in response to chronic kidney disease. - Patient advised to consult cardiologist regarding management of blood thinner around the time of surgery.  5. Pre-operative instructions: - NPO status post-midnight, with an exception for a small sip of water for medication purposes. - Patient informed of the potential for an overnight stay based on post-anesthesia recovery.  6. Echocardiogram: - Scheduled on the same day prior to undergoing the angiogram.  7. Right  wrist pain: - Condition possibly attributed to a sprain or strain. - Recommendation for caution and to monitor for any exacerbation of symptoms.                -  Procedure Codes: 92543 [id number], [id number] Complex e/m visit add on        -                  Electronically signed by Zee Colon MD, MD on 12/27/2024 at 04:46 PM CST   Sign off status: Completed         true      -  Provider: Zee Colon MD Date: 12/19/2024     Generated for Printing/Faxing/eTransmitting on: 01/08/2025 06:32 AM CST        History and Physical Notes    HPI (History of Present Illness)   History and Physical Notes -  Category Sub-Category Detail Notes Category Notes   Cardiovascular     History of CAD, in past mild and medically managed. She had an abnormal stress test in the last few months and seen by cardiology with plans for angiogram. Here today for preoperative clearance for this. she has a.fib which is a new diagnosis for her as well. was started on elliquis, but is not taking it due to cost. on dabigatran/pradaxa now which is more affordable.   Hypertension     She is on lisinopril 10 mg and amldoipine 10 mg daily. No issues with medication.   Hyperlipidemia     on atorvastatin 20 mg. last lipids 1/2022.   Diabetes     She has well controlled diabetes, diet controlled.   She is on a statin, last eye exam 8/29/22, on asa. She is on an ace inhibitor.    Osteoporosis     History of osteoporosis and now on prolia. Gets through endocrine. Taking calcium and vitamin d supplements. plan after her follow up and labs earlier this month is to stop prolia and start reclast. those notes were reviewed once we were able to get them   Hypothyroid     Taking levothyroxine 100 mcg daily.   General     The patient, Radha Tatum, presented for clearance after undergoing an angiogram and discussing her medication regimen. She reported that her previous prescription for Eliquis was too expensive, leading to a switch to dabigatran.  It was also given at the lower dose due to her age over 78 by me as at the time I felt her weight was low. However, the cardiologist pointed out that it was not the right dose and switched her. Radha also reported receiving a heart monitor unexpectedly, using it for three days, and is currently awaiting the results.    Radha expressed concerns about the angiogram procedure and its potential outcomes, including the possibility of needing a stent or open-heart bypass. She acknowledged the importance of being cautious and following the recommended treatment plan.    The patient also mentioned her right wrist has been hurting slightly, suspecting a minor sprain, and her knees are still causing her discomfort, but she is not interested in another replacement.    Preoperative History and Physical Location of Injury / Illness: see below      Type of Surgery: Angiogram     Requesting Surgeon: Dr. monsalve     Surgical Facility: [facility]     Underlying Health Problems: .see below     Blood Transfusion Status there is no transfusion refusal     Recovery Plans: At home, with family     Date of Surgery 01/08/2025         Physical Examination   History and Physical Notes -  Category Sub-Category Detail Notes Section Notes   HEENT Sclera: anicteric      Pupils: ERRLA     Extraocular motion intact     Oral cavity: normal, no lesions seen     Nose: normal, no lesions or rhinorrhea     Pharynx: no erythema or exudate     Tympanic membrane: normal with no perforations, retractions or bulging    NECK Thyroid: not enlarged      Cervical lymph nodes: normal     Carotid bruit not detected    EXTREMITIES Pulses: 2+ bilaterally with no ankle edema      Are intact    LUNGS Breath sounds: bilaterally clear to auscultation     HEART Rate and Rhythm: rate normal, rhythm regular, S1 & S2 nl, no murmur, S3 or S4     ABDOMEN No tenderness, organomegaly or masses      Bowel sounds normal    NEUROLOGICAL Coordination: normal      Reflexes:  2+ bilaterally    MUSCULOSKELETAL Lower extremity joints: unremarkable      Lumbar spine:      DERMATOLOGY Skin: no rashes or other lesions     GENERAL General Appearance: no acute distress        Patient Demographics    Patient Demographics  Patient Address Patient Name Communication   [address] [place] [address] Radha Tatum [phone] (Home)  [phone] (Mobile)     Patient Demographics  Language Race / Ethnicity Marital Status   English (Preferred) White / Not  or  unmarried     Patient Contacts    Patient Contacts  Contact Name Contact Address Communication Relationship to Patient   Crissy Renee Unknown [phone] Emergency Contact   Radha Tatum Unknown [phone] Guarantor     Document Information    Primary Care Provider Other Service Providers Document Coverage Dates   Zee Colon  NPI: [id number]  [phone]  [address] [place], [place] [id number]  Dec. 19, 2024December 19, 2024      Organization   [facility]  [phone]  [address] [id number]     Encounter Providers Encounter Date    Dec. 19, 2024December 19, 2024     Legal Authenticator    Zee Colon  NPI: [id number]  [phone]  [address] [place], [place] [id number]

## 2025-01-08 NOTE — INTERVAL H&P NOTE
"I have reviewed the surgical (or preoperative) H&P that is linked to this encounter, and examined the patient. There are no significant changes    Clinical Conditions Present on Arrival:  Clinically Significant Risk Factors Present on Admission                 # Drug Induced Coagulation Defect: home medication list includes an anticoagulant medication  # Drug Induced Platelet Defect: home medication list includes an antiplatelet medication      # Obesity: Estimated body mass index is 31.93 kg/m  as calculated from the following:    Height as of this encounter: 1.549 m (5' 1\").    Weight as of this encounter: 76.7 kg (169 lb).       "

## 2025-01-08 NOTE — DISCHARGE INSTRUCTIONS
Interventional Cardiology  Coronary Angiogram/Angioplasty/Stent/Atherectomy Discharge  Instructions -   Radial (wrist) Approach     The instructions below are to help you understand how to take care of yourself. There is also information about when to call the doctor or emergency services.    **Do not stop your aspirin or platelet inhibitor unless directed by your Cardiologist.  These medications help to prevent platelets in your blood from sticking together and forming a clot.   Examples of these medications are: Ticagrelor (Brilinta), Clopidogrel (Plavix), Prasugrel (Effient)    For 24 hours after procedure:  Do not subject hand/arm to any forceful movements for 24 hours, such as supporting weight when rising from a chair or bed.  Do not drive a car for 24 hours.  The dressing on the puncture site may be removed after 24 hours and left open to air. If minor oozing, you may apply a Band-Aid and remove after 12 hours.   You may shower on the day after your procedure. Do not take a tub bath or wash dishes (no soaking wrist) with the puncture site in water for 3 days after the procedure.    For 48 hours following the procedure:  Do not operate a lawnmower, motorcycle, chainsaw or all-terrain vehicle.  Do not lift anything heavier than 5-10 pounds with affected arm for 5 days.  Avoid excessive bending (flexion/extension) wrist movement.  Do not engage in vigorous exercise (i.e. tennis, golf) using the affected arm for 5 days after discharge.  You may return to work in 72 hours if no complications and no heavy lifting.    If bleeding should occur following discharge:  Sit down and apply firm pressure with your thumb against the puncture site and fingers against back of wrist for 10 minutes.  If the bleeding stops, continue to rest, keeping your wrist still for 2 hours. Notify your doctor as soon as possible.  If bleeding does not stop after 10 minutes or if there is a large amount of bleeding or spurting, call 911  immediately. Do not drive yourself to the hospital.           Contact the [facility] Clinic at [phone] if you develop:  Fever over 100.4, that lasts more than one day  Redness, heat, or pus at the puncture site  Change in color or temperature of the hand or arm    Expect mild tingling of hand and tenderness at the puncture site for up to 3 days. You may take Tylenol or a pain medicine recommended by your doctor.                             Your Procedural Physician was: Dr. Orta  the phone number is: [phone]    [facility]:  [phone]  If you are calling after hours, please listen to the entire voicemail, a live  will answer at the end of the message

## 2025-01-12 ENCOUNTER — TRANSFERRED RECORDS (OUTPATIENT)
Dept: HEALTH INFORMATION MANAGEMENT | Facility: CLINIC | Age: 80
End: 2025-01-12
Payer: MEDICARE

## 2025-01-28 ENCOUNTER — TELEPHONE (OUTPATIENT)
Dept: CARDIOLOGY | Facility: CLINIC | Age: 80
End: 2025-01-28
Payer: MEDICARE

## 2025-01-28 NOTE — TELEPHONE ENCOUNTER
"Received incoming phone call from pt who stated after a call with scheduling yesterday evening was left feeling very unsettled and worried about her heart.     Stated she had an appt 2/13/25 that was moved to 2/7/25 and stated she wasn't provided an explanation for why it needed to be changed. Pt felt the 's response was very sarcastic and non-comforting. Pt explained that she didn't want to have her appt moved if there wasn't any need to as the time worked better for her but stated she felt her \"hands were tied\".     Upon chart review, unable to find reasons indicating need for sooner appt. Apologized to pt for the interaction that caused her so much concern and reassured her that her appt can be moved to a date/time that works for her. Pt stated she felt better and more reassured. Transferred to scheduling to change appt. Noted moved back to 2/13/25 with GAIL. DARNELL  "

## 2025-02-06 NOTE — PROGRESS NOTES
HEART CARE FOLLOW UP NOTE      [facility]  [phone]      Assessment/Recommendations   Assessment: 79 year old female with dyspnea     Plan:  Dyspnea on exertion  Unclear etiology, normal echocardiogram and negative angiogram.  Suspect some mild deconditioning.  We discussed and her symptoms are stable and tolerable, she does not wish any more testing or procedures      -Consider attempt at CDV if symptoms worsen to see if NSR improves symptoms     Atrial fibrillation, persistent    Reviewed diagnosis, etiology, treatment strategies including rate vs rhythm control, and stroke risk (HPNUT-1-Vboc = 4).  She has some exertional dyspnea of unclear etiology, unlikely due to atrial fibrillation so will continue rate control for now        -Consider CDV to see if restoration of NSR improves dyspnea, see above         -Continue Pradaxa 150mg BID       -Return to clinic 6 months, if stable at that visit can move to yearly follow up      HTN  Well controlled      -Continue Lisinopril 10mg, Norvasc 10mg     Hyperlipidemia   LDL = 52      -Continue Lipitor 20mg     The longitudinal plan of care for the diagnosis(es)/condition(s) as documented were addressed during this visit. Due to the added complexity in care, I will continue to support Radha in the subsequent management and with ongoing continuity of care.          History of Present Illness/Subjective    Indication for visit:  Radha Tatum returns for follow up of dyspnea and was last seen on 11/26/2024 by myself.      HPI: Radha Tatum is a 79 year old female with a history of HTN, hyperlipidemia who I saw for evaluation of abnormal stress test.  She saw PCP and had stress test advised due to dyspnea on exertion that showed anterior and lateral ischemia, ECG noted atrial fibrillation, no prior diagnosis of arrhythmia.  She was advised to see cardiology.  I advised echocardiogram and angiogram that were normal, Zio with rate contorlled atrial  fibrillation.     She returns for follow up and reports she feels good overall, was nervous about the angiogram but happy how it turned out.  She denies chest pain, orthopnea, or PND.  She notes mild dyspnea on exertion after several flights of steps, feels this has been stable over many years, no palpitations.    I reviewed notes from PCP prior to this visit.         Physical Examination  Past Cardiac History   Vitals: /68 (BP Location: Left arm, Patient Position: Sitting, Cuff Size: Adult Regular)   Pulse 72   Resp 16   Wt 75.3 kg (166 lb)   BMI 31.37 kg/m    BMI= Body mass index is 31.37 kg/m .  Wt Readings from Last 3 Encounters:   02/13/25 75.3 kg (166 lb)   01/08/25 76.7 kg (169 lb)   11/26/24 76.7 kg (169 lb)       General Appearance:   no distress, normal body habitus   ENT/Mouth: membranes moist, no oral lesions or bleeding gums.      EYES:  no scleral icterus, normal conjunctivae   Neck: no carotid bruits or thyromegaly   Chest/Lungs:   lungs are clear to auscultation, no rales or wheezing,  sternal scar, equal chest wall expansion    Cardiovascular:   Irregular. Normal first and second heart sounds with no murmurs, rubs, or gallops; the carotid, radial and posterior tibial pulses are intact, Jugular venous pressure normal , no edema bilaterally    Abdomen:  no organomegaly, masses, bruits, or tenderness; bowel sounds are present   Extremities: no cyanosis or clubbing   Skin: no xanthelasma, warm.    Neurologic: normal  bilateral, no tremors           Atrial fibrillation, persistent     Zio: 12/13/2024  Zio monitoring from 12/6/2024 to 12/9/2024 (duration 3d).  Continuous atrial fibrillation, 41 to 148bpm, average 83bpm.  There were no pauses of greater than 3 seconds.  Rare premature ventricular contractions (<1%).  Symptom triggers correlated with AF with rare PVCs and AF with RVR.    Most Recent Echocardiogram: 1/8/2025  Left ventricular size, wall motion and function are normal. The  ejection  fraction is 55-60%.  Normal right ventricle size and systolic function.  Both atria are severely dilated.  No hemodynamically significant valvular abnormalities on 2D or color flow  imaging.  There is no comparison study available.    Most Recent Stress Test: 11/20/2024    Lexiscan stress ECG negative for ischemia.    The nuclear stress test is abnormal.  There is a medium size area of moderate ischemia involving the mid to distal anterior and anterolateral wall.    The left ventricular ejection fraction at stress is 67%.    The patient is at an intermediate risk of future cardiac ischemic events.    There is no prior study for comparison.    Most Recent Angiogram: 1/8/2025    Prox RCA to Mid RCA lesion is 30% stenosed.    Ost Cx to Prox Cx lesion is 30% stenosed.     1.  Right dominant system  2.  Mild disease ostial circumflex and mid RCA as noted above.  No significant stenoses.  3.  LVEDP = 12 mmHg       ECG (reviewed by myself): 1/8/2025 atrial fibrillation 79 bpm           Medical History  Family History Social History   Past Medical History:   Diagnosis Date    Arthritis     Bradycardia     Cataract     Coronary artery disease     required no intervention    Degenerative joint disease (DJD) of hip     Disease of thyroid gland     Glaucoma     Hearing loss     History of [place] measles     Hyperlipidemia     Hypertension     Hypothyroidism     Macular degeneration (senile) of retina     Motion sickness     Multinodular goiter     Osteoporosis     PONV (postoperative nausea and vomiting)      Family History   Problem Relation Age of Onset    Chronic Obstructive Pulmonary Disease Mother     Diabetes Mother     Liver Cancer Father     Anesthesia Reaction No family hx of     Clotting Disorder No family hx of         Social History     Socioeconomic History    Marital status:      Spouse name: Not on file    Number of children: Not on file    Years of education: Not on file    Highest education  level: Not on file   Occupational History    Not on file   Tobacco Use    Smoking status: Former     Current packs/day: 0.00     Types: Cigarettes     Quit date: 5/15/1991     Years since quittin.7    Smokeless tobacco: Never   Substance and Sexual Activity    Alcohol use: No    Drug use: No    Sexual activity: Not on file   Other Topics Concern    Not on file   Social History Narrative    Not on file     Social Drivers of Health     Financial Resource Strain: High Risk (2022)    Received from [facility]    Financial Resource Strain     Difficulty of Paying Living Expenses: Not on file     Difficulty of Paying Living Expenses: Not on file   Food Insecurity: Not on file   Transportation Needs: Not on file   Physical Activity: Not on file   Stress: Not on file   Social Connections: Unknown (2022)    Received from [facility]    Social Connections     Frequency of Communication with Friends and Family: Not on file   Interpersonal Safety: Low Risk  (2025)    Interpersonal Safety     Do you feel physically and emotionally safe where you currently live?: Yes     Within the past 12 months, have you been hit, slapped, kicked or otherwise physically hurt by someone?: No     Within the past 12 months, have you been humiliated or emotionally abused in other ways by your partner or ex-partner?: No   Housing Stability: Not on file           Medications  Allergies   Current Outpatient Medications   Medication Sig Dispense Refill    acetaminophen (TYLENOL) 500 MG tablet [ACETAMINOPHEN (TYLENOL) 500 MG TABLET] Take 2 tablets (1,000 mg total) by mouth 3 (three) times a day. (Patient taking differently: Take 500 mg by mouth as needed for pain.)  0    amLODIPine (NORVASC) 10 MG tablet [AMLODIPINE (NORVASC) 10 MG TABLET] Take 10 mg by mouth daily.      aspirin 81  MG EC tablet Take 81 mg by mouth daily      atorvastatin (LIPITOR) 20 MG tablet [ATORVASTATIN (LIPITOR) 20 MG TABLET] Take 20 mg by mouth bedtime.       Calcium Carb-Cholecalciferol (CALCIUM 600 + D) 600-5 MG-MCG TABS Take 1 tablet by mouth daily.      chlorpheniramine (CHLOR-TRIMETON) 4 mg tablet [CHLORPHENIRAMINE (CHLOR-TRIMETON) 4 MG TABLET] Take 4 mg by mouth every 6 (six) hours as needed for allergies.      cholecalciferol 50 MCG (2000 UT) CAPS Take 2 capsules by mouth daily.      dabigatran ANTICOAGULANT (PRADAXA) 150 MG capsule Take 1 capsule (150 mg) by mouth 2 times daily. Store in original 's bottle or blister pack; use within 120 days of opening. 180 capsule 2    denosumab (PROLIA) 60 mg/mL injection Inject 60 mg subcutaneously every 6 months.      levothyroxine (SYNTHROID, LEVOTHROID) 100 MCG tablet Take 112 mcg by mouth daily.  3    lisinopril (PRINIVIL,ZESTRIL) 10 MG tablet [LISINOPRIL (PRINIVIL,ZESTRIL) 10 MG TABLET] Take 10 mg by mouth daily.      nitroglycerin (NITROSTAT) 0.4 MG SL tablet Place 0.4 mg under the tongue every 5 minutes as needed.      timolol maleate (TIMOPTIC) 0.5 % ophthalmic solution [TIMOLOL MALEATE (TIMOPTIC) 0.5 % OPHTHALMIC SOLUTION] Administer 1 drop into the left eye daily.      vit C,H-Jc-wsahs-lutein-zeaxan (PRESERVISION AREDS 2) [phone] mg-unit-mg-mg cap [VIT C,A-LU-HNGHS-LUTEIN-ZEAXAN (PRESERVISION AREDS 2) [phone] MG-UNIT-MG-MG CAP] Take 1 capsule by mouth 2 (two) times a day.      naproxen (NAPROSYN) 500 MG tablet Take 500 mg by mouth 2 times daily as needed for moderate pain (Patient not taking: Reported on 2/13/2025)      omeprazole (PRILOSEC) 40 MG DR capsule Take 1 capsule (40 mg) by mouth 2 times daily (Patient not taking: Reported on 2/13/2025) 60 capsule 3    polyethylene glycol (MIRALAX) 17 gram packet [POLYETHYLENE GLYCOL (MIRALAX) 17 GRAM PACKET] Take 1 packet (17 g total) by mouth daily as needed. (Patient not taking: Reported on  "2/13/2025)  0       Allergies   Allergen Reactions    Other Environmental Allergy      Hospital bandaids cause rash and itchng    Pseudoephedrine Palpitations     Rapid heart rate    Typhoid Vaccine [Typhoid Vi Polysaccharide Vaccine] Unknown     rash          Lab Results    Chemistry/lipid CBC Cardiac Enzymes/BNP/TSH/INR   Recent Labs   Lab Test 01/08/25  0705   CHOL 131   HDL 60   LDL 59   TRIG 61     Recent Labs   Lab Test 01/08/25  0705 06/08/23  1318 01/13/22  1352   LDL 59 52 68     Recent Labs   Lab Test 01/08/25  0705      POTASSIUM 4.4   CHLORIDE 103   CO2 25   *   BUN 25.3*   CR 0.89   GFRESTIMATED 65   ROSEMARY 9.4     Recent Labs   Lab Test 01/08/25  0705 06/20/24  1117 06/08/23  1343   CR 0.89 0.86 0.75     No results for input(s): \"A1C\" in the last 95582 hours.       Recent Labs   Lab Test 01/08/25  0705   WBC 5.9   HGB 14.4   HCT 44.5   MCV 90        Recent Labs   Lab Test 01/08/25  0705 01/15/21  1227 09/16/19  1101   HGB 14.4 14.0 12.7    No results for input(s): \"TROPONINI\" in the last 67912 hours.  No results for input(s): \"BNP\", \"NTBNPI\", \"NTBNP\" in the last 14261 hours.  Recent Labs   Lab Test 10/03/24  1103   TSH 0.56     No results for input(s): \"INR\" in the last 43909 hours.     Lyndsay Mae MD  Noninvasive Cardiologist   [facility]                                                                  "

## 2025-02-13 ENCOUNTER — OFFICE VISIT (OUTPATIENT)
Dept: CARDIOLOGY | Facility: CLINIC | Age: 80
End: 2025-02-13
Payer: MEDICARE

## 2025-02-13 VITALS
HEART RATE: 72 BPM | DIASTOLIC BLOOD PRESSURE: 68 MMHG | RESPIRATION RATE: 16 BRPM | BODY MASS INDEX: 31.37 KG/M2 | WEIGHT: 166 LBS | SYSTOLIC BLOOD PRESSURE: 132 MMHG

## 2025-02-13 DIAGNOSIS — E78.5 HYPERLIPIDEMIA LDL GOAL <70: ICD-10-CM

## 2025-02-13 DIAGNOSIS — I48.19 PERSISTENT ATRIAL FIBRILLATION (H): Primary | ICD-10-CM

## 2025-02-13 DIAGNOSIS — I10 PRIMARY HYPERTENSION: ICD-10-CM

## 2025-02-13 NOTE — PATIENT INSTRUCTIONS
Your echocardiogram looks good, no heart failure or valve problems.  The angiogram was good also just mild blockages.    Continue the current medications, follow up with Dr khan in 6 months

## 2025-02-13 NOTE — LETTER
2/13/2025    Zee Colon MD  [address] [place] [address]    RE: Radha Tatum       Dear Colleague,     I had the pleasure of seeing Radha Tatum in the [facility].      HEART CARE FOLLOW UP NOTE      [facility]  [phone]      Assessment/Recommendations   Assessment: 79 year old female with dyspnea     Plan:  Dyspnea on exertion  Unclear etiology, normal echocardiogram and negative angiogram.  Suspect some mild deconditioning.  We discussed and her symptoms are stable and tolerable, she does not wish any more testing or procedures      -Consider attempt at CDV if symptoms worsen to see if NSR improves symptoms     Atrial fibrillation, persistent    Reviewed diagnosis, etiology, treatment strategies including rate vs rhythm control, and stroke risk ([id number] = 4).  She has some exertional dyspnea of unclear etiology, unlikely due to atrial fibrillation so will continue rate control for now        -Consider CDV to see if restoration of NSR improves dyspnea, see above         -Continue Pradaxa 150mg BID       -Return to clinic 6 months, if stable at that visit can move to yearly follow up      HTN  Well controlled      -Continue Lisinopril 10mg, Norvasc 10mg     Hyperlipidemia   LDL = 52      -Continue Lipitor 20mg     The longitudinal plan of care for the diagnosis(es)/condition(s) as documented were addressed during this visit. Due to the added complexity in care, I will continue to support Radha in the subsequent management and with ongoing continuity of care.          History of Present Illness/Subjective    Indication for visit:  Radha Tatum returns for follow up of dyspnea and was last seen on 11/26/2024 by myself.      HPI: Radha Tatum is a 79 year old female with a history of HTN, hyperlipidemia who I saw for evaluation of abnormal stress test.  She saw PCP and had stress test advised due to dyspnea on exertion that showed anterior and  lateral ischemia, ECG noted atrial fibrillation, no prior diagnosis of arrhythmia.  She was advised to see cardiology.  I advised echocardiogram and angiogram that were normal, Zio with rate contorlled atrial fibrillation.     She returns for follow up and reports she feels good overall, was nervous about the angiogram but happy how it turned out.  She denies chest pain, orthopnea, or PND.  She notes mild dyspnea on exertion after several flights of steps, feels this has been stable over many years, no palpitations.    I reviewed notes from PCP prior to this visit.         Physical Examination  Past Cardiac History   Vitals: /68 (BP Location: Left arm, Patient Position: Sitting, Cuff Size: Adult Regular)   Pulse 72   Resp 16   Wt 75.3 kg (166 lb)   BMI 31.37 kg/m    BMI= Body mass index is 31.37 kg/m .  Wt Readings from Last 3 Encounters:   02/13/25 75.3 kg (166 lb)   01/08/25 76.7 kg (169 lb)   11/26/24 76.7 kg (169 lb)       General Appearance:   no distress, normal body habitus   ENT/Mouth: membranes moist, no oral lesions or bleeding gums.      EYES:  no scleral icterus, normal conjunctivae   Neck: no carotid bruits or thyromegaly   Chest/Lungs:   lungs are clear to auscultation, no rales or wheezing,  sternal scar, equal chest wall expansion    Cardiovascular:   Irregular. Normal first and second heart sounds with no murmurs, rubs, or gallops; the carotid, radial and posterior tibial pulses are intact, Jugular venous pressure normal , no edema bilaterally    Abdomen:  no organomegaly, masses, bruits, or tenderness; bowel sounds are present   Extremities: no cyanosis or clubbing   Skin: no xanthelasma, warm.    Neurologic: normal  bilateral, no tremors           Atrial fibrillation, persistent     Zio: 12/13/2024  Zio monitoring from 12/6/2024 to 12/9/2024 (duration 3d).  Continuous atrial fibrillation, 41 to 148bpm, average 83bpm.  There were no pauses of greater than 3 seconds.  Rare premature  ventricular contractions (<1%).  Symptom triggers correlated with AF with rare PVCs and AF with RVR.    Most Recent Echocardiogram: 1/8/2025  Left ventricular size, wall motion and function are normal. The ejection  fraction is 55-60%.  Normal right ventricle size and systolic function.  Both atria are severely dilated.  No hemodynamically significant valvular abnormalities on 2D or color flow  imaging.  There is no comparison study available.    Most Recent Stress Test: 11/20/2024     Lexiscan stress ECG negative for ischemia.     The nuclear stress test is abnormal.  There is a medium size area of moderate ischemia involving the mid to distal anterior and anterolateral wall.     The left ventricular ejection fraction at stress is 67%.     The patient is at an intermediate risk of future cardiac ischemic events.     There is no prior study for comparison.    Most Recent Angiogram: 1/8/2025     Prox RCA to Mid RCA lesion is 30% stenosed.     Ost Cx to Prox Cx lesion is 30% stenosed.     1.  Right dominant system  2.  Mild disease ostial circumflex and mid RCA as noted above.  No significant stenoses.  3.  LVEDP = 12 mmHg       ECG (reviewed by myself): 1/8/2025 atrial fibrillation 79 bpm           Medical History  Family History Social History   Past Medical History:   Diagnosis Date     Arthritis      Bradycardia      Cataract      Coronary artery disease     required no intervention     Degenerative joint disease (DJD) of hip      Disease of thyroid gland      Glaucoma      Hearing loss      History of [place] measles      Hyperlipidemia      Hypertension      Hypothyroidism      Macular degeneration (senile) of retina      Motion sickness      Multinodular goiter      Osteoporosis      PONV (postoperative nausea and vomiting)      Family History   Problem Relation Age of Onset     Chronic Obstructive Pulmonary Disease Mother      Diabetes Mother      Liver Cancer Father      Anesthesia Reaction No family hx of       Clotting Disorder No family hx of         Social History     Socioeconomic History     Marital status:      Spouse name: Not on file     Number of children: Not on file     Years of education: Not on file     Highest education level: Not on file   Occupational History     Not on file   Tobacco Use     Smoking status: Former     Current packs/day: 0.00     Types: Cigarettes     Quit date: 5/15/1991     Years since quittin.7     Smokeless tobacco: Never   Substance and Sexual Activity     Alcohol use: No     Drug use: No     Sexual activity: Not on file   Other Topics Concern     Not on file   Social History Narrative     Not on file     Social Drivers of Health     Financial Resource Strain: High Risk (2022)    [facility]    Financial Resource Strain      Difficulty of Paying Living Expenses: Not on file      Difficulty of Paying Living Expenses: Not on file   Food Insecurity: Not on file   Transportation Needs: Not on file   Physical Activity: Not on file   Stress: Not on file   Social Connections: Unknown (2022)    [facility], [facility]    Social Connections      Frequency of Communication with Friends and Family: Not on file   Interpersonal Safety: Low Risk  (2025)    Interpersonal Safety      Do you feel physically and emotionally safe where you currently live?: Yes      Within the past 12 months, have you been hit, slapped, kicked or otherwise physically hurt by someone?: No      Within the past 12 months, have you been humiliated or emotionally abused in other ways by your partner or ex-partner?: No   Housing Stability: Not on file           Medications  Allergies   Current Outpatient Medications   Medication Sig Dispense Refill     acetaminophen (TYLENOL) 500 MG tablet [ACETAMINOPHEN (TYLENOL) 500 MG TABLET]  Take 2 tablets (1,000 mg total) by mouth 3 (three) times a day. (Patient taking differently: Take 500 mg by mouth as needed for pain.)  0     amLODIPine (NORVASC) 10 MG tablet [AMLODIPINE (NORVASC) 10 MG TABLET] Take 10 mg by mouth daily.       aspirin 81 MG EC tablet Take 81 mg by mouth daily       atorvastatin (LIPITOR) 20 MG tablet [ATORVASTATIN (LIPITOR) 20 MG TABLET] Take 20 mg by mouth bedtime.        Calcium Carb-Cholecalciferol (CALCIUM 600 + D) 600-5 MG-MCG TABS Take 1 tablet by mouth daily.       chlorpheniramine (CHLOR-TRIMETON) 4 mg tablet [CHLORPHENIRAMINE (CHLOR-TRIMETON) 4 MG TABLET] Take 4 mg by mouth every 6 (six) hours as needed for allergies.       cholecalciferol 50 MCG (2000 UT) CAPS Take 2 capsules by mouth daily.       dabigatran ANTICOAGULANT (PRADAXA) 150 MG capsule Take 1 capsule (150 mg) by mouth 2 times daily. Store in original 's bottle or blister pack; use within 120 days of opening. 180 capsule 2     denosumab (PROLIA) 60 mg/mL injection Inject 60 mg subcutaneously every 6 months.       levothyroxine (SYNTHROID, LEVOTHROID) 100 MCG tablet Take 112 mcg by mouth daily.  3     lisinopril (PRINIVIL,ZESTRIL) 10 MG tablet [LISINOPRIL (PRINIVIL,ZESTRIL) 10 MG TABLET] Take 10 mg by mouth daily.       nitroglycerin (NITROSTAT) 0.4 MG SL tablet Place 0.4 mg under the tongue every 5 minutes as needed.       timolol maleate (TIMOPTIC) 0.5 % ophthalmic solution [TIMOLOL MALEATE (TIMOPTIC) 0.5 % OPHTHALMIC SOLUTION] Administer 1 drop into the left eye daily.       vit C,A-Gc-pmtbp-lutein-zeaxan (PRESERVISION AREDS 2) 593-931-63-1 mg-unit-mg-mg cap [VIT C,K-XY-EMBRT-LUTEIN-ZEAXAN (PRESERVISION AREDS 2) 037-496-83-1 MG-UNIT-MG-MG CAP] Take 1 capsule by mouth 2 (two) times a day.       naproxen (NAPROSYN) 500 MG tablet Take 500 mg by mouth 2 times daily as needed for moderate pain (Patient not taking: Reported on 2/13/2025)       omeprazole (PRILOSEC) 40 MG DR capsule Take 1 capsule (40 mg)  "by mouth 2 times daily (Patient not taking: Reported on 2/13/2025) 60 capsule 3     polyethylene glycol (MIRALAX) 17 gram packet [POLYETHYLENE GLYCOL (MIRALAX) 17 GRAM PACKET] Take 1 packet (17 g total) by mouth daily as needed. (Patient not taking: Reported on 2/13/2025)  0       Allergies   Allergen Reactions     Other Environmental Allergy      Hospital bandaids cause rash and itchng     Pseudoephedrine Palpitations     Rapid heart rate     Typhoid Vaccine [Typhoid Vi Polysaccharide Vaccine] Unknown     rash          Lab Results    Chemistry/lipid CBC Cardiac Enzymes/BNP/TSH/INR   Recent Labs   Lab Test 01/08/25  0705   CHOL 131   HDL 60   LDL 59   TRIG 61     Recent Labs   Lab Test 01/08/25  0705 06/08/23  1318 01/13/22  1352   LDL 59 52 68     Recent Labs   Lab Test 01/08/25  0705      POTASSIUM 4.4   CHLORIDE 103   CO2 25   *   BUN 25.3*   CR 0.89   GFRESTIMATED 65   ROSEMARY 9.4     Recent Labs   Lab Test 01/08/25  0705 06/20/24  1117 06/08/23  1343   CR 0.89 0.86 0.75     No results for input(s): \"A1C\" in the last 61587 hours.       Recent Labs   Lab Test 01/08/25  0705   WBC 5.9   HGB 14.4   HCT 44.5   MCV 90        Recent Labs   Lab Test 01/08/25  0705 01/15/21  1227 09/16/19  1101   HGB 14.4 14.0 12.7    No results for input(s): \"TROPONINI\" in the last 91956 hours.  No results for input(s): \"BNP\", \"NTBNPI\", \"NTBNP\" in the last 85448 hours.  Recent Labs   Lab Test 10/03/24  1103   TSH 0.56     No results for input(s): \"INR\" in the last 61678 hours.     Lyndsay Mae MD  Noninvasive Cardiologist   [facility] [place]                                                                      Thank you for allowing me to participate in the care of your patient.      Sincerely,     Lyndsay Mae MD     [facility]  cc:   Terra Bolaños NP  [facility]  [address] [place] [address]      "

## 2025-06-19 ENCOUNTER — LAB REQUISITION (OUTPATIENT)
Dept: LAB | Facility: CLINIC | Age: 80
End: 2025-06-19
Payer: MEDICARE

## 2025-06-19 DIAGNOSIS — E11.9 TYPE 2 DIABETES MELLITUS WITHOUT COMPLICATIONS (H): ICD-10-CM

## 2025-06-19 DIAGNOSIS — E78.5 HYPERLIPIDEMIA, UNSPECIFIED: ICD-10-CM

## 2025-06-19 DIAGNOSIS — E89.0 POSTPROCEDURAL HYPOTHYROIDISM: ICD-10-CM

## 2025-06-19 LAB
ALBUMIN SERPL BCG-MCNC: 4.1 G/DL (ref 3.5–5.2)
ALP SERPL-CCNC: 59 U/L (ref 40–150)
ALT SERPL W P-5'-P-CCNC: 12 U/L (ref 0–50)
ANION GAP SERPL CALCULATED.3IONS-SCNC: 13 MMOL/L (ref 7–15)
AST SERPL W P-5'-P-CCNC: 17 U/L (ref 0–45)
BILIRUB SERPL-MCNC: 0.6 MG/DL
BUN SERPL-MCNC: 24 MG/DL (ref 8–23)
CALCIUM SERPL-MCNC: 9.6 MG/DL (ref 8.8–10.4)
CHLORIDE SERPL-SCNC: 102 MMOL/L (ref 98–107)
CHOLEST SERPL-MCNC: 142 MG/DL
CREAT SERPL-MCNC: 1.13 MG/DL (ref 0.51–0.95)
EGFRCR SERPLBLD CKD-EPI 2021: 49 ML/MIN/1.73M2
FASTING STATUS PATIENT QL REPORTED: ABNORMAL
FASTING STATUS PATIENT QL REPORTED: NORMAL
GLUCOSE SERPL-MCNC: 118 MG/DL (ref 70–99)
HCO3 SERPL-SCNC: 26 MMOL/L (ref 22–29)
HDLC SERPL-MCNC: 62 MG/DL
LDLC SERPL CALC-MCNC: 67 MG/DL
NONHDLC SERPL-MCNC: 80 MG/DL
POTASSIUM SERPL-SCNC: 4.4 MMOL/L (ref 3.4–5.3)
PROT SERPL-MCNC: 6.1 G/DL (ref 6.4–8.3)
SODIUM SERPL-SCNC: 141 MMOL/L (ref 135–145)
T4 FREE SERPL-MCNC: 1.75 NG/DL (ref 0.9–1.7)
TRIGL SERPL-MCNC: 67 MG/DL
TSH SERPL DL<=0.005 MIU/L-ACNC: 0.57 UIU/ML (ref 0.3–4.2)

## 2025-06-19 PROCEDURE — 80061 LIPID PANEL: CPT | Mod: ORL | Performed by: FAMILY MEDICINE

## 2025-06-19 PROCEDURE — 84439 ASSAY OF FREE THYROXINE: CPT | Mod: ORL | Performed by: FAMILY MEDICINE

## 2025-06-19 PROCEDURE — 84443 ASSAY THYROID STIM HORMONE: CPT | Mod: ORL | Performed by: FAMILY MEDICINE

## 2025-06-19 PROCEDURE — 80053 COMPREHEN METABOLIC PANEL: CPT | Mod: ORL | Performed by: FAMILY MEDICINE

## 2025-07-21 ENCOUNTER — LAB REQUISITION (OUTPATIENT)
Dept: LAB | Facility: CLINIC | Age: 80
End: 2025-07-21
Payer: MEDICARE

## 2025-07-21 DIAGNOSIS — M25.562 PAIN IN LEFT KNEE: ICD-10-CM

## 2025-07-21 LAB
BASOPHILS # BLD AUTO: 0.1 10E3/UL (ref 0–0.2)
BASOPHILS NFR BLD AUTO: 1 %
CRP SERPL-MCNC: <3 MG/L
EOSINOPHIL # BLD AUTO: 0.2 10E3/UL (ref 0–0.7)
EOSINOPHIL NFR BLD AUTO: 2 %
ERYTHROCYTE [DISTWIDTH] IN BLOOD BY AUTOMATED COUNT: 14.4 % (ref 10–15)
ERYTHROCYTE [SEDIMENTATION RATE] IN BLOOD BY WESTERGREN METHOD: 4 MM/HR (ref 0–30)
HCT VFR BLD AUTO: 44.9 % (ref 35–47)
HGB BLD-MCNC: 14.5 G/DL (ref 11.7–15.7)
IMM GRANULOCYTES # BLD: 0.1 10E3/UL
IMM GRANULOCYTES NFR BLD: 1 %
LYMPHOCYTES # BLD AUTO: 1.2 10E3/UL (ref 0.8–5.3)
LYMPHOCYTES NFR BLD AUTO: 11 %
MCH RBC QN AUTO: 30.5 PG (ref 26.5–33)
MCHC RBC AUTO-ENTMCNC: 32.3 G/DL (ref 31.5–36.5)
MCV RBC AUTO: 94 FL (ref 78–100)
MONOCYTES # BLD AUTO: 0.8 10E3/UL (ref 0–1.3)
MONOCYTES NFR BLD AUTO: 8 %
NEUTROPHILS # BLD AUTO: 8 10E3/UL (ref 1.6–8.3)
NEUTROPHILS NFR BLD AUTO: 77 %
NRBC # BLD AUTO: 0 10E3/UL
NRBC BLD AUTO-RTO: 0 /100
PLATELET # BLD AUTO: 249 10E3/UL (ref 150–450)
RBC # BLD AUTO: 4.76 10E6/UL (ref 3.8–5.2)
WBC # BLD AUTO: 10.3 10E3/UL (ref 4–11)

## 2025-07-21 PROCEDURE — 85025 COMPLETE CBC W/AUTO DIFF WBC: CPT | Mod: ORL | Performed by: FAMILY MEDICINE

## 2025-07-21 PROCEDURE — 85652 RBC SED RATE AUTOMATED: CPT | Mod: ORL | Performed by: FAMILY MEDICINE

## 2025-07-21 PROCEDURE — 86140 C-REACTIVE PROTEIN: CPT | Mod: ORL | Performed by: FAMILY MEDICINE

## 2025-08-11 ENCOUNTER — LAB REQUISITION (OUTPATIENT)
Dept: LAB | Facility: CLINIC | Age: 80
End: 2025-08-11
Payer: MEDICARE

## 2025-08-11 DIAGNOSIS — R42 DIZZINESS AND GIDDINESS: ICD-10-CM

## 2025-08-12 LAB
ALBUMIN SERPL BCG-MCNC: 3.9 G/DL (ref 3.5–5.2)
ALP SERPL-CCNC: 65 U/L (ref 40–150)
ALT SERPL W P-5'-P-CCNC: 13 U/L (ref 0–50)
ANION GAP SERPL CALCULATED.3IONS-SCNC: 13 MMOL/L (ref 7–15)
AST SERPL W P-5'-P-CCNC: 18 U/L (ref 0–45)
BILIRUB SERPL-MCNC: 0.6 MG/DL
BUN SERPL-MCNC: 27.3 MG/DL (ref 8–23)
CALCIUM SERPL-MCNC: 9.4 MG/DL (ref 8.8–10.4)
CHLORIDE SERPL-SCNC: 100 MMOL/L (ref 98–107)
CREAT SERPL-MCNC: 1.36 MG/DL (ref 0.51–0.95)
EGFRCR SERPLBLD CKD-EPI 2021: 39 ML/MIN/1.73M2
GLUCOSE SERPL-MCNC: 116 MG/DL (ref 70–99)
HCO3 SERPL-SCNC: 24 MMOL/L (ref 22–29)
POTASSIUM SERPL-SCNC: 4.7 MMOL/L (ref 3.4–5.3)
PROT SERPL-MCNC: 6.3 G/DL (ref 6.4–8.3)
SODIUM SERPL-SCNC: 137 MMOL/L (ref 135–145)
TSH SERPL DL<=0.005 MIU/L-ACNC: 1.16 UIU/ML (ref 0.3–4.2)

## 2025-09-02 DIAGNOSIS — I48.19 PERSISTENT ATRIAL FIBRILLATION (H): ICD-10-CM

## 2025-09-02 DIAGNOSIS — I20.0 UNSTABLE ANGINA (H): ICD-10-CM

## 2025-09-02 RX ORDER — DABIGATRAN ETEXILATE 150 MG/1
150 CAPSULE ORAL 2 TIMES DAILY
Qty: 180 CAPSULE | Refills: 2 | Status: SHIPPED | OUTPATIENT
Start: 2025-09-02

## (undated) DEVICE — MANIFOLD KIT ANGIO AUTOMATED 014613

## (undated) DEVICE — SYR ANGIOGRAPHY MULTIUSE KIT ACIST 014612

## (undated) DEVICE — CUSTOM PACK CORONARY SAN5BCRHEA

## (undated) DEVICE — KIT HAND CONTROL ACIST 014644 AR-P54

## (undated) DEVICE — Device

## (undated) DEVICE — INFLATION DEVICE BIG 60 ENDO-AN6012

## (undated) DEVICE — CATH DIAGNOSTIC RADIAL 5FR TIG 4.0

## (undated) DEVICE — ELECTRODE DEFIB CADENCE 22550R

## (undated) DEVICE — SLEEVE TR BAND RADIAL COMPRESSION DEVICE 24CM TRB24-REG

## (undated) DEVICE — SOL WATER IRRIG 500ML BOTTLE 2F7113

## (undated) DEVICE — SHTH INTRO 0.021IN ID 6FR DIA

## (undated) RX ORDER — FENTANYL CITRATE 50 UG/ML
INJECTION, SOLUTION INTRAMUSCULAR; INTRAVENOUS
Status: DISPENSED
Start: 2025-01-08

## (undated) RX ORDER — ONDANSETRON 2 MG/ML
INJECTION INTRAMUSCULAR; INTRAVENOUS
Status: DISPENSED
Start: 2025-01-08